# Patient Record
Sex: FEMALE | Race: BLACK OR AFRICAN AMERICAN | NOT HISPANIC OR LATINO | Employment: FULL TIME | URBAN - METROPOLITAN AREA
[De-identification: names, ages, dates, MRNs, and addresses within clinical notes are randomized per-mention and may not be internally consistent; named-entity substitution may affect disease eponyms.]

---

## 2017-06-13 ENCOUNTER — LAB VISIT (OUTPATIENT)
Dept: LAB | Facility: OTHER | Age: 52
End: 2017-06-13
Attending: OBSTETRICS & GYNECOLOGY
Payer: MEDICAID

## 2017-06-13 ENCOUNTER — OFFICE VISIT (OUTPATIENT)
Dept: OBSTETRICS AND GYNECOLOGY | Facility: CLINIC | Age: 52
End: 2017-06-13
Attending: OBSTETRICS & GYNECOLOGY
Payer: MEDICAID

## 2017-06-13 VITALS
HEIGHT: 66 IN | BODY MASS INDEX: 23.24 KG/M2 | WEIGHT: 144.63 LBS | SYSTOLIC BLOOD PRESSURE: 110 MMHG | DIASTOLIC BLOOD PRESSURE: 78 MMHG

## 2017-06-13 DIAGNOSIS — N93.9 ABNORMAL UTERINE BLEEDING (AUB): Primary | ICD-10-CM

## 2017-06-13 DIAGNOSIS — E04.0 GOITER DIFFUSE: ICD-10-CM

## 2017-06-13 DIAGNOSIS — I15.9 SECONDARY HYPERTENSION: ICD-10-CM

## 2017-06-13 DIAGNOSIS — D25.9 UTERINE LEIOMYOMA, UNSPECIFIED LOCATION: ICD-10-CM

## 2017-06-13 DIAGNOSIS — Z12.39 SCREENING BREAST EXAMINATION: ICD-10-CM

## 2017-06-13 LAB — TSH SERPL DL<=0.005 MIU/L-ACNC: 0.98 UIU/ML

## 2017-06-13 PROCEDURE — 36415 COLL VENOUS BLD VENIPUNCTURE: CPT

## 2017-06-13 PROCEDURE — 99999 PR PBB SHADOW E&M-EST. PATIENT-LVL III: CPT | Mod: PBBFAC,,, | Performed by: OBSTETRICS & GYNECOLOGY

## 2017-06-13 PROCEDURE — 99204 OFFICE O/P NEW MOD 45 MIN: CPT | Mod: S$PBB,,, | Performed by: OBSTETRICS & GYNECOLOGY

## 2017-06-13 PROCEDURE — 84443 ASSAY THYROID STIM HORMONE: CPT

## 2017-06-13 PROCEDURE — 88175 CYTOPATH C/V AUTO FLUID REDO: CPT

## 2017-06-13 NOTE — PROGRESS NOTES
"CC: AUB, fibroids    HPI: Katie Simmons is a 52 yo female who presents for evaluation of uterine fibroids which she states were diagnosed by a Dr. Shields in [place].  History of abnormal bleeding, states she was treated with a "uterus Freeze," due to heavy bleeding with severe anemia, had a colonoscopy at that time with polyp noted.  After uterine treatment, continued to have heavy periods.  Stopped bleeding/ having periods last year and had 5-7 months without periods, started bleeding again 2 months ago.  Reports hot flashes.  Current bleeding is reported as heavy and with clots.  Patient states she is not interested in a hysterectomy.      ROS:  GENERAL: Feeling well overall. Denies fever or chills.   SKIN: Denies rash or lesions.   HEAD: Denies head injury or headache.   NODES: Denies enlarged lymph nodes.   CHEST: Denies chest pain or shortness of breath.   CARDIOVASCULAR: Denies palpitations or left sided chest pain.   ABDOMEN: No abdominal pain, constipation, diarrhea, nausea, vomiting or rectal bleeding.   URINARY: No dysuria, hematuria, or burning on urination.  REPRODUCTIVE: See HPI.   BREASTS: Denies pain, lumps, or nipple discharge.   HEMATOLOGIC: No easy bruisability or excessive bleeding.   MUSCULOSKELETAL: Denies joint pain or swelling.   NEUROLOGIC: Denies syncope or weakness.   PSYCHIATRIC: Denies depression, anxiety or mood swings.    PE:   /78   Ht 5' 6" (1.676 m)   Wt 65.6 kg (144 lb 10 oz)   LMP 06/12/2017   BMI 23.34 kg/m²     APPEARANCE: Well nourished, well developed, Black or  female in no acute distress.  NODES: no cervical, supraclavicular, or inguinal lymphadenopathy  BREASTS: Symmetrical, no skin changes or visible lesions. No palpable masses, nipple discharge or adenopathy bilaterally.  ABDOMEN: Soft. No tenderness or masses. No distention. No hernias palpated. No CVA tenderness.  VULVA: No lesions. Normal external female genitalia.  URETHRAL MEATUS: " Normal size and location, no lesions, no prolapse.  URETHRA: No masses, tenderness, or prolapse.  VAGINA: Moist. No lesions or lacerations noted. No abnormal discharge present. No odor present.   CERVIX: No lesions or discharge. No cervical motion tenderness.   UTERUS: globular, multiple fibroids noted, anteverted, approximately 8 week size  ADNEXA: No tenderness. No fullness or masses palpated in the adnexal regions.   ANUS PERINEUM: Normal.      Diagnosis:  1. Abnormal uterine bleeding (AUB)    2. Uterine leiomyoma, unspecified location    3. Goiter diffuse    4. Secondary hypertension        Plan:     Orders Placed This Encounter    US Pelvis Comp with Transvag NON-OB (xpd    TSH    Liquid-based pap smear, screening         Follow-up with me in based on ultrasound    Nithya Saleh DO

## 2017-06-14 ENCOUNTER — HOSPITAL ENCOUNTER (OUTPATIENT)
Dept: RADIOLOGY | Facility: OTHER | Age: 52
Discharge: HOME | End: 2017-06-14
Attending: OBSTETRICS & GYNECOLOGY
Payer: MEDICAID

## 2017-06-14 ENCOUNTER — TELEPHONE (OUTPATIENT)
Dept: OBSTETRICS AND GYNECOLOGY | Facility: CLINIC | Age: 52
End: 2017-06-14

## 2017-06-14 VITALS — HEIGHT: 65 IN | BODY MASS INDEX: 23.99 KG/M2 | WEIGHT: 144 LBS

## 2017-06-14 DIAGNOSIS — Z12.39 SCREENING BREAST EXAMINATION: ICD-10-CM

## 2017-06-14 DIAGNOSIS — Z12.31 VISIT FOR SCREENING MAMMOGRAM: ICD-10-CM

## 2017-06-14 PROCEDURE — 77067 SCR MAMMO BI INCL CAD: CPT | Mod: TC

## 2017-06-14 PROCEDURE — 77063 BREAST TOMOSYNTHESIS BI: CPT | Mod: 26,,, | Performed by: RADIOLOGY

## 2017-06-14 PROCEDURE — 77067 SCR MAMMO BI INCL CAD: CPT | Mod: 26,,, | Performed by: RADIOLOGY

## 2017-06-15 ENCOUNTER — HOSPITAL ENCOUNTER (OUTPATIENT)
Dept: RADIOLOGY | Facility: OTHER | Age: 52
Discharge: HOME | End: 2017-06-15
Attending: OBSTETRICS & GYNECOLOGY
Payer: MEDICAID

## 2017-06-15 DIAGNOSIS — D25.9 UTERINE LEIOMYOMA, UNSPECIFIED LOCATION: ICD-10-CM

## 2017-06-15 DIAGNOSIS — N93.9 ABNORMAL UTERINE BLEEDING (AUB): ICD-10-CM

## 2017-06-15 PROCEDURE — 76856 US EXAM PELVIC COMPLETE: CPT | Mod: 26,,, | Performed by: RADIOLOGY

## 2017-06-15 PROCEDURE — 76856 US EXAM PELVIC COMPLETE: CPT | Mod: TC

## 2017-06-15 PROCEDURE — 76830 TRANSVAGINAL US NON-OB: CPT | Mod: 26,,, | Performed by: RADIOLOGY

## 2017-06-20 ENCOUNTER — TELEPHONE (OUTPATIENT)
Dept: OBSTETRICS AND GYNECOLOGY | Facility: CLINIC | Age: 52
End: 2017-06-20

## 2017-06-20 DIAGNOSIS — D25.9 UTERINE LEIOMYOMA, UNSPECIFIED LOCATION: Primary | ICD-10-CM

## 2017-06-20 NOTE — TELEPHONE ENCOUNTER
Called to inform pt per  of the following:multiple fibroids, will need f/u visit for discussion of options for management.   Pt stated that she understood.

## 2017-07-07 ENCOUNTER — OFFICE VISIT (OUTPATIENT)
Dept: OBSTETRICS AND GYNECOLOGY | Facility: CLINIC | Age: 52
End: 2017-07-07
Attending: OBSTETRICS & GYNECOLOGY
Payer: MEDICAID

## 2017-07-07 ENCOUNTER — LAB VISIT (OUTPATIENT)
Dept: LAB | Facility: OTHER | Age: 52
End: 2017-07-07
Attending: OBSTETRICS & GYNECOLOGY
Payer: MEDICAID

## 2017-07-07 VITALS
BODY MASS INDEX: 25.3 KG/M2 | SYSTOLIC BLOOD PRESSURE: 140 MMHG | HEIGHT: 65 IN | DIASTOLIC BLOOD PRESSURE: 60 MMHG | WEIGHT: 151.88 LBS

## 2017-07-07 DIAGNOSIS — D64.9 ANEMIA, UNSPECIFIED TYPE: Primary | ICD-10-CM

## 2017-07-07 DIAGNOSIS — N93.9 ABNORMAL UTERINE BLEEDING (AUB): ICD-10-CM

## 2017-07-07 DIAGNOSIS — D64.9 ANEMIA, UNSPECIFIED TYPE: ICD-10-CM

## 2017-07-07 DIAGNOSIS — D25.9 UTERINE LEIOMYOMA, UNSPECIFIED LOCATION: ICD-10-CM

## 2017-07-07 LAB
ANISOCYTOSIS BLD QL SMEAR: SLIGHT
B-HCG UR QL: NEGATIVE
BASOPHILS # BLD AUTO: 0.03 K/UL
BASOPHILS NFR BLD: 0.5 %
CTP QC/QA: YES
DIFFERENTIAL METHOD: ABNORMAL
EOSINOPHIL # BLD AUTO: 0.1 K/UL
EOSINOPHIL NFR BLD: 2.3 %
ERYTHROCYTE [DISTWIDTH] IN BLOOD BY AUTOMATED COUNT: 22.9 %
HCT VFR BLD AUTO: 32.1 %
HGB BLD-MCNC: 10 G/DL
LYMPHOCYTES # BLD AUTO: 2.3 K/UL
LYMPHOCYTES NFR BLD: 40.6 %
MCH RBC QN AUTO: 20.9 PG
MCHC RBC AUTO-ENTMCNC: 31.2 %
MCV RBC AUTO: 67 FL
MONOCYTES # BLD AUTO: 0.5 K/UL
MONOCYTES NFR BLD: 9 %
NEUTROPHILS # BLD AUTO: 2.7 K/UL
NEUTROPHILS NFR BLD: 47.6 %
OVALOCYTES BLD QL SMEAR: ABNORMAL
PLATELET # BLD AUTO: 302 K/UL
PLATELET BLD QL SMEAR: ABNORMAL
PMV BLD AUTO: ABNORMAL FL
POIKILOCYTOSIS BLD QL SMEAR: SLIGHT
RBC # BLD AUTO: 4.79 M/UL
SCHISTOCYTES BLD QL SMEAR: ABNORMAL
WBC # BLD AUTO: 5.66 K/UL

## 2017-07-07 PROCEDURE — 85025 COMPLETE CBC W/AUTO DIFF WBC: CPT

## 2017-07-07 PROCEDURE — 99999 PR PBB SHADOW E&M-EST. PATIENT-LVL III: CPT | Mod: PBBFAC,,, | Performed by: OBSTETRICS & GYNECOLOGY

## 2017-07-07 PROCEDURE — 99214 OFFICE O/P EST MOD 30 MIN: CPT | Mod: S$PBB,,, | Performed by: OBSTETRICS & GYNECOLOGY

## 2017-07-07 PROCEDURE — 36415 COLL VENOUS BLD VENIPUNCTURE: CPT

## 2017-07-07 NOTE — PROGRESS NOTES
"CC: AUB, fibroids, F/U    HPI: Brett Simmons is a 50 yo female who presents for evaluation of uterine fibroids which she states were diagnosed by a Dr. Shields in [place].  History of abnormal bleeding, states she was treated with a "uterus Freeze," due to heavy bleeding with severe anemia, had a colonoscopy at that time with polyp noted.  After uterine treatment, continued to have heavy periods.  Stopped bleeding/ having periods last year and had 5-7 months without periods, started bleeding again 2 months ago, regular monthly menses at this point, last 5 days, q 27 days, first couple of days are heavy, no bleeding in between periods.   History of transfusion due to severe anemia in 2010/11Reports hot flashes.  Current bleeding is reported as heavy and with clots.  Recently had an ultrasound demonstrating:      The uterus is slightly enlarged measuring 12.7 x 7.8 x 7.6 cm and the endometrial stripe is not clearly visualized due to the presence of multiple fibroids, but measures approximately 1.0 cm. Posterior subserosal fibroids measure 5.4 and 5.0 cm, respectively. Anterior subserosal and intramural fibroids measure 3.0 and 2.5 cm, respectively.    The right ovary measures 1.9 x 1.4 x 1.8 cm with normal blood flow.    The left ovary measures 2.4 x 1.4 x 1.8 cm with normal blood flow.    There is no free pelvic fluid.    ROS:  GENERAL: Feeling well overall. Denies fever or chills.   SKIN: Denies rash or lesions.   HEAD: Denies head injury or headache.   NODES: Denies enlarged lymph nodes.   CHEST: Denies chest pain or shortness of breath.   CARDIOVASCULAR: Denies palpitations or left sided chest pain.   ABDOMEN: No abdominal pain, constipation, diarrhea, nausea, vomiting or rectal bleeding.   URINARY: No dysuria, hematuria, or burning on urination.  REPRODUCTIVE: See HPI.   BREASTS: Denies pain, lumps, or nipple discharge.   HEMATOLOGIC: No easy bruisability or excessive bleeding.   MUSCULOSKELETAL: Denies " "joint pain or swelling.   NEUROLOGIC: Denies syncope or weakness.   PSYCHIATRIC: Denies depression, anxiety or mood swings.    PE:   BP (!) 140/60   Ht 5' 5" (1.651 m)   Wt 68.9 kg (151 lb 14.4 oz)   LMP 07/07/2017 (Exact Date)   BMI 25.28 kg/m²     APPEARANCE: Well nourished, well developed, Black or  female in no acute distress.  NODES: no cervical, supraclavicular, or inguinal lymphadenopathy  BREASTS: Symmetrical, no skin changes or visible lesions. No palpable masses, nipple discharge or adenopathy bilaterally.  ABDOMEN: Soft. No tenderness or masses. No distention. No hernias palpated. No CVA tenderness.  VULVA: No lesions. Normal external female genitalia.  URETHRAL MEATUS: Normal size and location, no lesions, no prolapse.  URETHRA: No masses, tenderness, or prolapse.  VAGINA: Moist. No lesions or lacerations noted. No abnormal discharge present. No odor present.   CERVIX: No lesions or discharge. No cervical motion tenderness.   UTERUS: globular, multiple fibroids noted, anteverted, approximately 8 week size  ADNEXA: No tenderness. No fullness or masses palpated in the adnexal regions.   ANUS PERINEUM: Normal.      Diagnosis:  1. Anemia, unspecified type    2. Abnormal uterine bleeding (AUB)    3. Uterine leiomyoma, unspecified location        Plan:   Discussed options for management of fibroids including hyst, UAE, myomectomy and watchful waiting, will consider, desires no intervention at this time.  Will get CBC today to see if intervention is urgent.  Orders Placed This Encounter    CBC auto differential    POCT Urine Pregnancy         Follow-up with me in based on labs    Nithya Saleh DO  "

## 2017-07-26 ENCOUNTER — TELEPHONE (OUTPATIENT)
Dept: OBSTETRICS AND GYNECOLOGY | Facility: CLINIC | Age: 52
End: 2017-07-26

## 2017-07-26 NOTE — TELEPHONE ENCOUNTER
Called to inform patient that her PCP prescribed the Rx that she would like to have refilled, I informed her that she would need to give them a  Call for a refill. Pt stated that she understood.

## 2017-07-26 NOTE — TELEPHONE ENCOUNTER
----- Message from Ld Hernandez sent at 7/25/2017 12:48 PM CDT -----  X_  1st Request  _  2nd Request  _  3rd Request    Please refill the medication(s) listed below. Please call the patient when the prescription(s) is ready for  at the phone number [phone] .    Medication #1  lisinopril-hydrochlorothiazide (PRINZIDE,ZESTORETIC) 20-12.5 mg per tablet 60 tablet 1 7/22/2013 8/21/2013 --  Sig - Route: Take 1 tablet by mouth 2 (two) times daily. - Oral  Class: Normal  Order: [id number]

## 2019-11-19 ENCOUNTER — HOSPITAL ENCOUNTER (EMERGENCY)
Facility: HOSPITAL | Age: 54
Discharge: HOME | End: 2019-11-19
Attending: EMERGENCY MEDICINE
Payer: MEDICAID

## 2019-11-19 VITALS
HEART RATE: 71 BPM | OXYGEN SATURATION: 100 % | RESPIRATION RATE: 18 BRPM | SYSTOLIC BLOOD PRESSURE: 143 MMHG | TEMPERATURE: 98 F | DIASTOLIC BLOOD PRESSURE: 82 MMHG

## 2019-11-19 DIAGNOSIS — R53.83 FATIGUE, UNSPECIFIED TYPE: ICD-10-CM

## 2019-11-19 DIAGNOSIS — M54.42 ACUTE BILATERAL LOW BACK PAIN WITH BILATERAL SCIATICA: ICD-10-CM
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
## 2019-11-19 DIAGNOSIS — Z72.821 INADEQUATE SLEEP HYGIENE: ICD-10-CM

## 2019-11-19 DIAGNOSIS — M54.41 ACUTE BILATERAL LOW BACK PAIN WITH BILATERAL SCIATICA: ICD-10-CM

## 2019-11-19 DIAGNOSIS — R55 FAINTED: ICD-10-CM

## 2019-11-19 LAB
ANION GAP SERPL CALC-SCNC: 9 MMOL/L (ref 8–16)
B-HCG UR QL: NEGATIVE
BASOPHILS # BLD AUTO: 0.04 K/UL (ref 0–0.2)
BASOPHILS NFR BLD: 1.3 % (ref 0–1.9)
BUN SERPL-MCNC: 14 MG/DL (ref 6–20)
CALCIUM SERPL-MCNC: 9.5 MG/DL (ref 8.7–10.5)
CHLORIDE SERPL-SCNC: 109 MMOL/L (ref 95–110)
CO2 SERPL-SCNC: 22 MMOL/L (ref 23–29)
CREAT SERPL-MCNC: 0.8 MG/DL (ref 0.5–1.4)
CTP QC/QA: YES
DIFFERENTIAL METHOD: ABNORMAL
EOSINOPHIL # BLD AUTO: 0.1 K/UL (ref 0–0.5)
EOSINOPHIL NFR BLD: 3.8 % (ref 0–8)
ERYTHROCYTE [DISTWIDTH] IN BLOOD BY AUTOMATED COUNT: 13 % (ref 11.5–14.5)
EST. GFR  (AFRICAN AMERICAN): >60 ML/MIN/1.73 M^2
EST. GFR  (NON AFRICAN AMERICAN): >60 ML/MIN/1.73 M^2
GLUCOSE SERPL-MCNC: 97 MG/DL (ref 70–110)
HCT VFR BLD AUTO: 40.8 % (ref 37–48.5)
HGB BLD-MCNC: 12.7 G/DL (ref 12–16)
IMM GRANULOCYTES # BLD AUTO: 0.02 K/UL (ref 0–0.04)
IMM GRANULOCYTES NFR BLD AUTO: 0.6 % (ref 0–0.5)
LYMPHOCYTES # BLD AUTO: 1.4 K/UL (ref 1–4.8)
LYMPHOCYTES NFR BLD: 43.5 % (ref 18–48)
MCH RBC QN AUTO: 27.3 PG (ref 27–31)
MCHC RBC AUTO-ENTMCNC: 31.1 G/DL (ref 32–36)
MCV RBC AUTO: 88 FL (ref 82–98)
MONOCYTES # BLD AUTO: 0.4 K/UL (ref 0.3–1)
MONOCYTES NFR BLD: 13 % (ref 4–15)
NEUTROPHILS # BLD AUTO: 1.2 K/UL (ref 1.8–7.7)
NEUTROPHILS NFR BLD: 37.8 % (ref 38–73)
NRBC BLD-RTO: 0 /100 WBC
PLATELET # BLD AUTO: 209 K/UL (ref 150–350)
PMV BLD AUTO: 11.2 FL (ref 9.2–12.9)
POCT GLUCOSE: 81 MG/DL (ref 70–110)
POTASSIUM SERPL-SCNC: 3.7 MMOL/L (ref 3.5–5.1)
RBC # BLD AUTO: 4.66 M/UL (ref 4–5.4)
SODIUM SERPL-SCNC: 140 MMOL/L (ref 136–145)
WBC # BLD AUTO: 3.15 K/UL (ref 3.9–12.7)

## 2019-11-19 PROCEDURE — 85025 COMPLETE CBC W/AUTO DIFF WBC: CPT

## 2019-11-19 PROCEDURE — 93010 EKG 12-LEAD: ICD-10-PCS | Mod: ,,, | Performed by: INTERNAL MEDICINE

## 2019-11-19 PROCEDURE — 81025 URINE PREGNANCY TEST: CPT | Performed by: EMERGENCY MEDICINE

## 2019-11-19 PROCEDURE — 99284 EMERGENCY DEPT VISIT MOD MDM: CPT | Mod: 25

## 2019-11-19 PROCEDURE — 93010 ELECTROCARDIOGRAM REPORT: CPT | Mod: ,,, | Performed by: INTERNAL MEDICINE

## 2019-11-19 PROCEDURE — 99285 PR EMERGENCY DEPT VISIT,LEVEL V: ICD-10-PCS | Mod: ,,, | Performed by: PHYSICIAN ASSISTANT

## 2019-11-19 PROCEDURE — 99285 EMERGENCY DEPT VISIT HI MDM: CPT | Mod: ,,, | Performed by: PHYSICIAN ASSISTANT

## 2019-11-19 PROCEDURE — 93005 ELECTROCARDIOGRAM TRACING: CPT

## 2019-11-19 PROCEDURE — 80048 BASIC METABOLIC PNL TOTAL CA: CPT

## 2019-11-19 PROCEDURE — 25000003 PHARM REV CODE 250: Performed by: PHYSICIAN ASSISTANT

## 2019-11-19 RX ORDER — ACETAMINOPHEN 325 MG/1
650 TABLET ORAL
Status: COMPLETED | OUTPATIENT
Start: 2019-11-19 | End: 2019-11-19

## 2019-11-19 RX ORDER — KETOROLAC TROMETHAMINE 10 MG/1
10 TABLET, FILM COATED ORAL
Status: DISCONTINUED | OUTPATIENT
Start: 2019-11-19 | End: 2019-11-19

## 2019-11-19 RX ORDER — NABUMETONE 500 MG/1
500 TABLET, FILM COATED ORAL 2 TIMES DAILY PRN
Qty: 14 TABLET | Refills: 0 | Status: SHIPPED | OUTPATIENT
Start: 2019-11-19 | End: 2019-11-26

## 2019-11-19 RX ADMIN — ACETAMINOPHEN 650 MG: 325 TABLET ORAL at 12:11
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
## 2019-11-19 NOTE — ED NOTES
Patient identifiers verified and correct for MS Simmons  C/C: Low back pain, onrma thigh pain  SEE NN  APPEARANCE: awake and alert in NAD.  SKIN: warm, dry and intact. No breakdown or bruising.  MUSCULOSKELETAL: Patient moving all extremities spontaneously, no obvious swelling or deformities noted. Ambulates independently.  RESPIRATORY: Denies shortness of breath.Respirations unlabored.   CARDIAC: Denies CP, 2+ distal pulses; no peripheral edema  ABDOMEN: S/ND/NT, Denies nausea  : voids spontaneously, denies difficulty  Neurologic: AAO x 4; follows commands equal strength in all extremities; denies numbness/tingling. Denies dizziness Denies weakness, ambulatory on scene
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
## 2022-11-07 ENCOUNTER — OCCUPATIONAL HEALTH (OUTPATIENT)
Dept: URGENT CARE | Facility: CLINIC | Age: 57
End: 2022-11-07

## 2022-11-07 DIAGNOSIS — Z02.89 ENCOUNTER FOR EXAMINATION REQUIRED BY DEPARTMENT OF TRANSPORTATION (DOT): Primary | ICD-10-CM

## 2022-11-07 PROCEDURE — 99499 PHYSICAL, RECERT DOT/CDL: ICD-10-PCS | Mod: S$GLB,,, | Performed by: NURSE PRACTITIONER

## 2022-11-07 PROCEDURE — 99499 UNLISTED E&M SERVICE: CPT | Mod: S$GLB,,, | Performed by: NURSE PRACTITIONER

## 2023-06-14 ENCOUNTER — OFFICE VISIT (OUTPATIENT)
Dept: URGENT CARE | Facility: CLINIC | Age: 58
End: 2023-06-14
Payer: COMMERCIAL

## 2023-06-14 VITALS
TEMPERATURE: 98 F | BODY MASS INDEX: 24.99 KG/M2 | WEIGHT: 150 LBS | DIASTOLIC BLOOD PRESSURE: 90 MMHG | HEIGHT: 65 IN | OXYGEN SATURATION: 97 % | SYSTOLIC BLOOD PRESSURE: 129 MMHG | HEART RATE: 80 BPM
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
## 2023-06-14 DIAGNOSIS — Z02.6 ENCOUNTER RELATED TO WORKER'S COMPENSATION CLAIM: ICD-10-CM

## 2023-06-14 DIAGNOSIS — S46.811A STRAIN OF RIGHT TRAPEZIUS MUSCLE, INITIAL ENCOUNTER: Primary | ICD-10-CM

## 2023-06-14 PROCEDURE — 99214 PR OFFICE/OUTPT VISIT, EST, LEVL IV, 30-39 MIN: ICD-10-PCS | Mod: S$GLB,,, | Performed by: NURSE PRACTITIONER

## 2023-06-14 PROCEDURE — 99214 OFFICE O/P EST MOD 30 MIN: CPT | Mod: S$GLB,,, | Performed by: NURSE PRACTITIONER

## 2023-06-14 RX ORDER — TIZANIDINE 4 MG/1
4 TABLET ORAL NIGHTLY PRN
Qty: 30 TABLET | Refills: 0 | Status: SHIPPED | OUTPATIENT
Start: 2023-06-14
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
## 2023-06-21 ENCOUNTER — OFFICE VISIT (OUTPATIENT)
Dept: URGENT CARE | Facility: CLINIC | Age: 58
End: 2023-06-21
Payer: COMMERCIAL

## 2023-06-21 VITALS
HEART RATE: 71 BPM | RESPIRATION RATE: 16 BRPM | DIASTOLIC BLOOD PRESSURE: 84 MMHG | BODY MASS INDEX: 24.99 KG/M2 | OXYGEN SATURATION: 97 % | HEIGHT: 65 IN | SYSTOLIC BLOOD PRESSURE: 127 MMHG | WEIGHT: 150 LBS

## 2023-06-21 DIAGNOSIS — S29.012D MUSCLE STRAIN OF RIGHT UPPER BACK, SUBSEQUENT ENCOUNTER: Primary | ICD-10-CM
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
## 2023-06-21 DIAGNOSIS — S29.011D MUSCLE STRAIN OF CHEST WALL, SUBSEQUENT ENCOUNTER: ICD-10-CM

## 2023-06-21 DIAGNOSIS — Z02.6 ENCOUNTER RELATED TO WORKER'S COMPENSATION CLAIM: ICD-10-CM

## 2023-06-21 PROCEDURE — 99214 OFFICE O/P EST MOD 30 MIN: CPT | Mod: S$GLB,,, | Performed by: STUDENT IN AN ORGANIZED HEALTH CARE EDUCATION/TRAINING PROGRAM

## 2023-06-21 PROCEDURE — 99214 PR OFFICE/OUTPT VISIT, EST, LEVL IV, 30-39 MIN: ICD-10-PCS | Mod: S$GLB,,, | Performed by: STUDENT IN AN ORGANIZED HEALTH CARE EDUCATION/TRAINING PROGRAM

## 2023-06-21 RX ORDER — LOSARTAN POTASSIUM 100 MG/1
100 TABLET ORAL
COMMUNITY
Start: 2023-06-14

## 2023-06-21 RX ORDER — IBUPROFEN 600 MG/1
600 TABLET ORAL EVERY 8 HOURS PRN
COMMUNITY
Start: 2023-06-13

## 2023-06-21 RX ORDER — AMLODIPINE BESYLATE 5 MG/1
1 TABLET ORAL DAILY
COMMUNITY
Start: 2022-09-27
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
## 2023-07-07 ENCOUNTER — OFFICE VISIT (OUTPATIENT)
Dept: URGENT CARE | Facility: CLINIC | Age: 58
End: 2023-07-07
Payer: COMMERCIAL

## 2023-07-07 VITALS
TEMPERATURE: 98 F | WEIGHT: 149 LBS | HEART RATE: 72 BPM | RESPIRATION RATE: 16 BRPM | DIASTOLIC BLOOD PRESSURE: 84 MMHG | BODY MASS INDEX: 24.83 KG/M2 | HEIGHT: 65 IN | SYSTOLIC BLOOD PRESSURE: 135 MMHG | OXYGEN SATURATION: 98 %

## 2023-07-07 DIAGNOSIS — S29.011D MUSCLE STRAIN OF CHEST WALL, SUBSEQUENT ENCOUNTER: ICD-10-CM

## 2023-07-07 DIAGNOSIS — Z02.6 ENCOUNTER RELATED TO WORKER'S COMPENSATION CLAIM: ICD-10-CM

## 2023-07-07 DIAGNOSIS — S29.012D MUSCLE STRAIN OF RIGHT UPPER BACK, SUBSEQUENT ENCOUNTER: Primary | ICD-10-CM

## 2023-07-07 PROCEDURE — 99213 OFFICE O/P EST LOW 20 MIN: CPT | Mod: S$GLB,,, | Performed by: PHYSICIAN ASSISTANT

## 2023-07-07 PROCEDURE — 99213 PR OFFICE/OUTPT VISIT, EST, LEVL III, 20-29 MIN: ICD-10-PCS | Mod: S$GLB,,, | Performed by: PHYSICIAN ASSISTANT

## 2023-07-07 RX ORDER — IBUPROFEN 600 MG/1
600 TABLET ORAL EVERY 6 HOURS PRN
Qty: 28 TABLET | Refills: 0 | Status: SHIPPED | OUTPATIENT
Start: 2023-07-07 | End: 2023-07-14
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
## 2023-07-14 ENCOUNTER — OFFICE VISIT (OUTPATIENT)
Dept: URGENT CARE | Facility: CLINIC | Age: 58
End: 2023-07-14
Payer: COMMERCIAL

## 2023-07-14 VITALS
HEIGHT: 65 IN | WEIGHT: 149 LBS | SYSTOLIC BLOOD PRESSURE: 154 MMHG | OXYGEN SATURATION: 98 % | BODY MASS INDEX: 24.83 KG/M2 | RESPIRATION RATE: 18 BRPM | DIASTOLIC BLOOD PRESSURE: 85 MMHG | HEART RATE: 74 BPM

## 2023-07-14 DIAGNOSIS — S46.811D STRAIN OF RIGHT TRAPEZIUS MUSCLE, SUBSEQUENT ENCOUNTER: ICD-10-CM

## 2023-07-14 DIAGNOSIS — S29.012D MUSCLE STRAIN OF RIGHT UPPER BACK, SUBSEQUENT ENCOUNTER: Primary | ICD-10-CM

## 2023-07-14 DIAGNOSIS — Z02.6 ENCOUNTER RELATED TO WORKER'S COMPENSATION CLAIM: ICD-10-CM
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
## 2023-07-14 DIAGNOSIS — S29.011D MUSCLE STRAIN OF CHEST WALL, SUBSEQUENT ENCOUNTER: ICD-10-CM

## 2023-07-14 DIAGNOSIS — S16.1XXA STRAIN OF CERVICAL PORTION OF LEFT TRAPEZIUS MUSCLE: ICD-10-CM

## 2023-07-14 PROCEDURE — 99215 PR OFFICE/OUTPT VISIT, EST, LEVL V, 40-54 MIN: ICD-10-PCS | Mod: S$GLB,,, | Performed by: STUDENT IN AN ORGANIZED HEALTH CARE EDUCATION/TRAINING PROGRAM

## 2023-07-14 PROCEDURE — 99215 OFFICE O/P EST HI 40 MIN: CPT | Mod: S$GLB,,, | Performed by: STUDENT IN AN ORGANIZED HEALTH CARE EDUCATION/TRAINING PROGRAM

## 2023-07-14 RX ORDER — DICLOFENAC SODIUM 10 MG/G
2 GEL TOPICAL 3 TIMES DAILY
Qty: 100 G | Refills: 0 | Status: SHIPPED | OUTPATIENT
Start: 2023-07-14
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
## 2023-07-21 ENCOUNTER — OFFICE VISIT (OUTPATIENT)
Dept: URGENT CARE | Facility: CLINIC | Age: 58
End: 2023-07-21
Payer: COMMERCIAL

## 2023-07-21 VITALS
HEIGHT: 65 IN | WEIGHT: 149 LBS | DIASTOLIC BLOOD PRESSURE: 82 MMHG | HEART RATE: 75 BPM | BODY MASS INDEX: 24.83 KG/M2 | TEMPERATURE: 99 F | OXYGEN SATURATION: 100 % | SYSTOLIC BLOOD PRESSURE: 131 MMHG

## 2023-07-21 DIAGNOSIS — S16.1XXA STRAIN OF CERVICAL PORTION OF LEFT TRAPEZIUS MUSCLE: ICD-10-CM

## 2023-07-21 DIAGNOSIS — S29.011D MUSCLE STRAIN OF CHEST WALL, SUBSEQUENT ENCOUNTER: ICD-10-CM

## 2023-07-21 DIAGNOSIS — Z02.6 ENCOUNTER RELATED TO WORKER'S COMPENSATION CLAIM: Primary | ICD-10-CM
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
## 2023-07-21 DIAGNOSIS — S29.012D MUSCLE STRAIN OF RIGHT UPPER BACK, SUBSEQUENT ENCOUNTER: ICD-10-CM

## 2023-07-21 DIAGNOSIS — S46.811D STRAIN OF RIGHT TRAPEZIUS MUSCLE, SUBSEQUENT ENCOUNTER: ICD-10-CM

## 2023-07-21 PROCEDURE — 99214 PR OFFICE/OUTPT VISIT, EST, LEVL IV, 30-39 MIN: ICD-10-PCS | Mod: S$GLB,,, | Performed by: NURSE PRACTITIONER

## 2023-07-21 PROCEDURE — 99214 OFFICE O/P EST MOD 30 MIN: CPT | Mod: S$GLB,,, | Performed by: NURSE PRACTITIONER
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
## 2023-08-04 ENCOUNTER — OFFICE VISIT (OUTPATIENT)
Dept: URGENT CARE | Facility: CLINIC | Age: 58
End: 2023-08-04
Payer: COMMERCIAL

## 2023-08-04 VITALS
TEMPERATURE: 98 F | RESPIRATION RATE: 16 BRPM | SYSTOLIC BLOOD PRESSURE: 136 MMHG | DIASTOLIC BLOOD PRESSURE: 82 MMHG | OXYGEN SATURATION: 99 % | HEART RATE: 69 BPM | BODY MASS INDEX: 23.32 KG/M2 | WEIGHT: 140 LBS | HEIGHT: 65 IN

## 2023-08-04 DIAGNOSIS — S16.1XXA STRAIN OF CERVICAL PORTION OF LEFT TRAPEZIUS MUSCLE: ICD-10-CM

## 2023-08-04 DIAGNOSIS — S46.811D STRAIN OF RIGHT TRAPEZIUS MUSCLE, SUBSEQUENT ENCOUNTER: ICD-10-CM

## 2023-08-04 DIAGNOSIS — Z02.6 ENCOUNTER RELATED TO WORKER'S COMPENSATION CLAIM: ICD-10-CM

## 2023-08-04 DIAGNOSIS — M25.611 DECREASED RANGE OF MOTION OF RIGHT SHOULDER: ICD-10-CM

## 2023-08-04 DIAGNOSIS — S46.001D ROTATOR CUFF INJURY, RIGHT, SUBSEQUENT ENCOUNTER: Primary | ICD-10-CM

## 2023-08-04 DIAGNOSIS — M54.6 ACUTE BILATERAL THORACIC BACK PAIN: ICD-10-CM

## 2023-08-04 DIAGNOSIS — S29.011D MUSCLE STRAIN OF CHEST WALL, SUBSEQUENT ENCOUNTER: ICD-10-CM

## 2023-08-04 PROCEDURE — 99215 PR OFFICE/OUTPT VISIT, EST, LEVL V, 40-54 MIN: ICD-10-PCS | Mod: S$GLB,,, | Performed by: STUDENT IN AN ORGANIZED HEALTH CARE EDUCATION/TRAINING PROGRAM

## 2023-08-04 PROCEDURE — 99215 OFFICE O/P EST HI 40 MIN: CPT | Mod: S$GLB,,, | Performed by: STUDENT IN AN ORGANIZED HEALTH CARE EDUCATION/TRAINING PROGRAM

## 2023-08-04 NOTE — LETTER
[facility]  [address] [address]  Phone: [phone]  Fax: [phone]  [facility]: [email or web address]    Pt Name: Brett Simmons  Injury Date: 04/09/2023   Employee ID: [id number] Date of Treatment: 08/04/2023   Company: [employer]      Appointment Time: 01:15 PM Arrived: 1:30pm   Provider: Milagros Louis MD Time Out:1:39pm     Office Treatment:   1. Rotator cuff injury, right, subsequent encounter    2. Encounter related to worker's compensation claim    3. Decreased range of motion of right shoulder    4. Strain of right trapezius muscle, subsequent encounter    5. Muscle strain of chest wall, subsequent encounter    6. Strain of cervical portion of left trapezius muscle    7. Acute bilateral thoracic back pain          Patient Instructions: MRI to be scheduled once authorized, Attention not to aggravate affected area      Restrictions: No lifting/pushing/pulling more than 10 lbs, No above the shoulder/overhead work, No driving company vehicles     Return Appointment: 8/18/2023 at 1:30pm.  [facility]
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
## 2023-08-18 ENCOUNTER — OFFICE VISIT (OUTPATIENT)
Dept: URGENT CARE | Facility: CLINIC | Age: 58
End: 2023-08-18
Payer: COMMERCIAL

## 2023-08-18 VITALS
BODY MASS INDEX: 24.83 KG/M2 | HEART RATE: 84 BPM | WEIGHT: 149 LBS | DIASTOLIC BLOOD PRESSURE: 79 MMHG | SYSTOLIC BLOOD PRESSURE: 129 MMHG | TEMPERATURE: 99 F | RESPIRATION RATE: 16 BRPM | HEIGHT: 65 IN | OXYGEN SATURATION: 100 %

## 2023-08-18 DIAGNOSIS — S29.011D MUSCLE STRAIN OF CHEST WALL, SUBSEQUENT ENCOUNTER: ICD-10-CM

## 2023-08-18 DIAGNOSIS — S16.1XXA STRAIN OF CERVICAL PORTION OF LEFT TRAPEZIUS MUSCLE: ICD-10-CM

## 2023-08-18 DIAGNOSIS — S46.811D STRAIN OF RIGHT TRAPEZIUS MUSCLE, SUBSEQUENT ENCOUNTER: ICD-10-CM

## 2023-08-18 DIAGNOSIS — M54.6 ACUTE BILATERAL THORACIC BACK PAIN: ICD-10-CM

## 2023-08-18 DIAGNOSIS — M25.611 DECREASED RANGE OF MOTION OF RIGHT SHOULDER: ICD-10-CM

## 2023-08-18 DIAGNOSIS — Z02.6 ENCOUNTER RELATED TO WORKER'S COMPENSATION CLAIM: ICD-10-CM

## 2023-08-18 DIAGNOSIS — S46.001D ROTATOR CUFF INJURY, RIGHT, SUBSEQUENT ENCOUNTER: Primary | ICD-10-CM

## 2023-08-18 PROCEDURE — 99214 OFFICE O/P EST MOD 30 MIN: CPT | Mod: S$GLB,,, | Performed by: NURSE PRACTITIONER

## 2023-08-18 PROCEDURE — 99214 PR OFFICE/OUTPT VISIT, EST, LEVL IV, 30-39 MIN: ICD-10-PCS | Mod: S$GLB,,, | Performed by: NURSE PRACTITIONER

## 2023-08-18 NOTE — LETTER
[facility]  [address] [address]  Phone: [phone]  Fax: [phone]  [facility]: [email or web address]    Pt Name: Brett Simmons  Injury Date: 04/09/2023   Employee ID: [id number] Date of Treatment: 08/18/2023   Company: [email or web address]      Appointment Time: 01:30 PM Arrived: 1:32 PM   Provider: Lisa Vargas NP Time Out:3:31 PM      Office Treatment:   1. Rotator cuff injury, right, subsequent encounter    2. Decreased range of motion of right shoulder    3. Strain of right trapezius muscle, subsequent encounter    4. Muscle strain of chest wall, subsequent encounter    5. Strain of cervical portion of left trapezius muscle    6. Acute bilateral thoracic back pain    7. Encounter related to worker's compensation claim          Patient Instructions: Attention not to aggravate affected area, Daily home exercises/warm soaks, PT to be scheduled once authorized, MRI to be scheduled once authorized      Restrictions: No lifting/pushing/pulling more than 10 lbs, No driving company vehicles, No above the shoulder/overhead work     Return Appointment: 9/1/2023 at 1:30 PM ELIZABETH

## 2023-08-18 NOTE — PROGRESS NOTES
Subjective:      Patient ID: Brett Simmons is a 57 y.o. female.    Chief Complaint: Shoulder Injury and Chest Pain    Patient's place of employment - [employer]  Patient's job title -   Date of Injury - 4/9/23  Body part injured - Chest / Shoulder  Current work status per last visit - No activity  Improved, same, or worse - same  Pain Scale right now (1-10) -  8    Ms Simmons still has not had MRI or PT approved. Takes Ibuprofen occasionally (which her PCP prescribed). Still does not have w/c pharmacy card. Not working. Continues to have neck and shoulder pain. Condition unchanged since previous visit. MWT    Shoulder Injury   Associated symptoms include chest pain. Pertinent negatives include no numbness.   Chest Pain   Associated symptoms include back pain. Pertinent negatives include no numbness.       Neck: Positive for neck pain.   Cardiovascular:  Positive for chest pain.   Musculoskeletal:  Positive for pain, joint pain, abnormal ROM of joint and back pain.   Skin:  Negative for erythema and bruising.   Neurological:  Negative for numbness and tingling.     Objective:     Physical Exam  Constitutional:       General: She is not in acute distress.     Appearance: Normal appearance.   HENT:      Right Ear: External ear normal.      Left Ear: External ear normal.   Eyes:      Conjunctiva/sclera: Conjunctivae normal.   Cardiovascular:      Pulses: Normal pulses.   Pulmonary:      Effort: Pulmonary effort is normal.   Chest:       Musculoskeletal:         General: Tenderness present. No swelling.      Right shoulder: Tenderness present. Decreased range of motion. Normal strength. Normal pulse.      Left shoulder: Normal strength. Normal pulse.      Cervical back: Swelling and tenderness present. Decreased range of motion.        Back:       Comments: Pain to posterior neck with radiation to both trapezius.  Pain to right axillary region.  Pain to right shoulder with range of motion testing.  Complain of  cramping in hands.  Pain is worse on right side than left.   Skin:     Capillary Refill: Capillary refill takes less than 2 seconds.      Findings: No bruising or erythema.   Neurological:      General: No focal deficit present.      Mental Status: She is alert and oriented to person, place, and time.   Psychiatric:         Mood and Affect: Mood normal.         Behavior: Behavior normal.         Thought Content: Thought content normal.         Judgment: Judgment normal.        Assessment:      1. Rotator cuff injury, right, subsequent encounter    2. Decreased range of motion of right shoulder    3. Strain of right trapezius muscle, subsequent encounter    4. Muscle strain of chest wall, subsequent encounter    5. Strain of cervical portion of left trapezius muscle    6. Acute bilateral thoracic back pain    7. Encounter related to worker's compensation claim      Plan:     I have placed another order today for physical therapy.  Spoke with Donte in the [facility] regarding MRI order as well as physical therapy status.  Ms. Simmons will continue to try to contact her worker's comp .     Patient Instructions: Attention not to aggravate affected area, Daily home exercises/warm soaks, PT to be scheduled once authorized, MRI to be scheduled once authorized   Restrictions: No lifting/pushing/pulling more than 10 lbs, No driving company vehicles, No above the shoulder/overhead work  Follow up in about 2 weeks (around 9/1/2023).    I spent a total of 40 minutes on the day of the visit.This includes face to face time and non-face to face time preparing to see the patient (eg, review of tests), obtaining and/or reviewing separately obtained history, documenting clinical information in the electronic or other health record, independently interpreting results and communicating results to the patient/family/caregiver, or care coordinator.

## 2023-08-23 ENCOUNTER — CLINICAL SUPPORT (OUTPATIENT)
Dept: REHABILITATION | Facility: HOSPITAL | Age: 58
End: 2023-08-23
Payer: COMMERCIAL

## 2023-08-23 DIAGNOSIS — G89.29 CHRONIC RIGHT SHOULDER PAIN: ICD-10-CM

## 2023-08-23 DIAGNOSIS — M25.511 CHRONIC RIGHT SHOULDER PAIN: ICD-10-CM

## 2023-08-23 PROCEDURE — 97161 PT EVAL LOW COMPLEX 20 MIN: CPT | Mod: PO

## 2023-08-23 PROCEDURE — 97112 NEUROMUSCULAR REEDUCATION: CPT | Mod: PO

## 2023-08-23 PROCEDURE — 97140 MANUAL THERAPY 1/> REGIONS: CPT | Mod: PO

## 2023-08-23 NOTE — PROGRESS NOTES
"[facility]   Physical Therapy Initial Evaluation      Name: Brett Simmons  Clinic Number: [id number]    Therapy Diagnosis: No diagnosis found.     Physician: Lisa Vargas NP    Physician Orders: PT Eval and Treat   Medical Diagnosis from Referral:   M25.611 (ICD-10-CM) - Decreased range of motion of right shoulder   S46.001D (ICD-10-CM) - Rotator cuff injury, right, subsequent encounter   S46.811D (ICD-10-CM) - Strain of right trapezius muscle, subsequent encounter   S16.1XXA (ICD-10-CM) - Strain of cervical portion of left trapezius muscle   M54.6 (ICD-10-CM) - Acute bilateral thoracic back pain     Evaluation Date: 2023  Authorization Period Expiration: 2023  Plan of Care Expiration: 10/23/2023  Progress Note Due: 2023  Visit # / Visits authorized:    FOTO: 1/ 3    Precautions: {IP WOUND PRECAUTIONS OHS:09079}     Time In: 2:45  Time Out: 3:30  Total Billable Time: 45 minutes    Subjective     Date of onset: ***    History of current condition - [place] reports: ***    Falls: ***    Imaging: {Mri/ctscan/bone scan:26763}: ***    Prior Therapy: ***  Social History: *** {LIVES WITH:37304}  Occupation: ***  Prior Level of Function: ***  Current Level of Function: ***    Pain:  Current {0-10:90045::"0"}/10, worst {0-10:88858::"0"}/10, best {0-10:79609::"0"}/10   Location: {RIGHT LEFT BILATERAL:56653} {LOCATION ON BODY:71721} {Pain Loc:21902}  Description: {Pain Description:81235}  Aggravating Factors: {Causes; Pain:27922}  Easing Factors: {Pain (activities that relieve):63599}    Patients goals: ***     Medical History:   Past Medical History:   Diagnosis Date    Blood transfusion     Hypertension        Surgical History:   Brett Simmons  has a past surgical history that includes  section.    Medications:   Brett Wayne has a current medication list which includes the following prescription(s): amlodipine, diclofenac sodium, ferrous sulfate, " "ibuprofen, losartan, and tizanidine.    Allergies:   Review of patient's allergies indicates:   Allergen Reactions    Tetracyclines Hives        Objective      ***    Intake Outcome Measure for FOTO *** Survey    Therapist reviewed FOTO scores for Brett Simmons on 8/23/2023.   FOTO report - see Media section or FOTO account episode details.    Intake Score: ***%         Treatment     Total Treatment time (time-based codes) separate from Evaluation: *** minutes     Brett Wayne received the treatments listed below:      therapeutic exercises to develop {AMB PT PROGRESS OBJECTIVE:42006} for *** minutes including:  ***    manual therapy techniques: {AMB PT PROGRESS MANUAL THERAPY:75792} were applied to the: *** for *** minutes, including:  ***    neuromuscular re-education activities to improve: {AMB PT PROGRESS NEURO RE-ED:17343} for *** minutes. The following activities were included:  ***    therapeutic activities to improve functional performance for ***  minutes, including:  ***    gait training to improve functional mobility and safety for ***  minutes, including:  ***    direct contact modalities after being cleared for contraindications: {AMB PT PROGRESS DIRECT CONTACT MODES:30159}    supervised modalities after being cleared for contradictions: {AMB PT SUPERVISED MODES:04121}    hot pack for *** minutes to ***.    cold pack for *** minutes to ***.    Patient Education and Home Exercises     Education provided:   - ***    Written Home Exercises Provided: {Blank single:16754::"yes","Patient instructed to cont prior HEP"}. Exercises were reviewed and Brett Wayne was able to demonstrate them prior to the end of the session.  Brett Wayne demonstrated {Desc; good/fair/poor:04389} understanding of the education provided. See EMR under Patient Instructions for exercises provided during therapy sessions.    Assessment     Brett Wayne is a 57 y.o. female referred to outpatient Physical Therapy with a medical diagnosis of ***. " "Patient presents with ***    Patient prognosis is {REHAB PROGNOSIS OHS:71264}.   Patient will benefit from skilled outpatient Physical Therapy to address the deficits stated above and in the chart below, provide patient /family education, and to maximize patientt's level of independence.     Plan of care discussed with patient: {YES:32645}  Patient's spiritual, cultural and educational needs considered and patient is agreeable to the plan of care and goals as stated below:     Anticipated Barriers for therapy: ***    Medical Necessity is demonstrated by the following  History  Co-morbidities and personal factors that may impact the plan of care [] LOW: no personal factors / co-morbidities  [] MODERATE: 1-2 personal factors / co-morbidities  [] HIGH: 3+ personal factors / co-morbidities    Moderate / High Support Documentation:   Co-morbidities affecting plan of care: ***    Personal Factors:   {Personal Factors:77894}     Examination  Body Structures and Functions, activity limitations and participation restrictions that may impact the plan of care [] LOW: addressing 1-2 elements  [] MODERATE: 3+ elements  [] HIGH: 4+ elements (please support below)    Moderate / High Support Documentation: ***     Clinical Presentation [] LOW: stable  [] MODERATE: Evolving  [] HIGH: Unstable     Decision Making/ Complexity Score: {Desc; low/moderate/high:943455}       Goals:  Short Term Goals: *** weeks   ***    Long Term Goals: *** weeks   ***  Plan     Plan of care Certification: 8/23/2023 to ***.    Outpatient Physical Therapy {NUMBERS 1-5:45954} times weekly for {0-10:36652::"0"} weeks to include the following interventions: {[place] PLAN:03141}.     Iftikhar Cesar, PT        Physician's Signature: _________________________________________ Date: ________________     "
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
## 2023-08-26 ENCOUNTER — HOSPITAL ENCOUNTER (OUTPATIENT)
Dept: RADIOLOGY | Facility: OTHER | Age: 58
Discharge: HOME | End: 2023-08-26
Attending: STUDENT IN AN ORGANIZED HEALTH CARE EDUCATION/TRAINING PROGRAM
Payer: COMMERCIAL

## 2023-08-26 DIAGNOSIS — Z02.6 ENCOUNTER RELATED TO WORKER'S COMPENSATION CLAIM: ICD-10-CM

## 2023-08-26 DIAGNOSIS — M25.611 DECREASED RANGE OF MOTION OF RIGHT SHOULDER: ICD-10-CM

## 2023-08-26 DIAGNOSIS — S46.001D ROTATOR CUFF INJURY, RIGHT, SUBSEQUENT ENCOUNTER: ICD-10-CM

## 2023-08-26 DIAGNOSIS — S46.811D STRAIN OF RIGHT TRAPEZIUS MUSCLE, SUBSEQUENT ENCOUNTER: ICD-10-CM

## 2023-08-26 PROCEDURE — 73221 MRI JOINT UPR EXTREM W/O DYE: CPT | Mod: TC,RT

## 2023-08-26 PROCEDURE — 73221 MRI JOINT UPR EXTREM W/O DYE: CPT | Mod: 26,RT,, | Performed by: RADIOLOGY

## 2023-08-26 PROCEDURE — 73221 MRI SHOULDER WITHOUT CONTRAST RIGHT: ICD-10-PCS | Mod: 26,RT,, | Performed by: RADIOLOGY

## 2023-08-28 PROBLEM — G89.29 CHRONIC RIGHT SHOULDER PAIN: Status: ACTIVE | Noted: 2023-08-28

## 2023-08-28 PROBLEM — M25.511 CHRONIC RIGHT SHOULDER PAIN: Status: ACTIVE | Noted: 2023-08-28
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
## 2023-09-01 ENCOUNTER — OFFICE VISIT (OUTPATIENT)
Dept: URGENT CARE | Facility: CLINIC | Age: 58
End: 2023-09-01
Payer: COMMERCIAL

## 2023-09-01 VITALS
HEART RATE: 71 BPM | BODY MASS INDEX: 23.32 KG/M2 | HEIGHT: 65 IN | TEMPERATURE: 99 F | OXYGEN SATURATION: 98 % | RESPIRATION RATE: 16 BRPM | DIASTOLIC BLOOD PRESSURE: 85 MMHG | SYSTOLIC BLOOD PRESSURE: 148 MMHG | WEIGHT: 140 LBS

## 2023-09-01 DIAGNOSIS — Z02.6 ENCOUNTER RELATED TO WORKER'S COMPENSATION CLAIM: ICD-10-CM

## 2023-09-01 DIAGNOSIS — S46.811D STRAIN OF RIGHT TRAPEZIUS MUSCLE, SUBSEQUENT ENCOUNTER: ICD-10-CM

## 2023-09-01 DIAGNOSIS — S29.011D MUSCLE STRAIN OF CHEST WALL, SUBSEQUENT ENCOUNTER: ICD-10-CM

## 2023-09-01 DIAGNOSIS — M75.101 TEAR OF RIGHT SUPRASPINATUS TENDON: Primary | ICD-10-CM

## 2023-09-01 PROCEDURE — 99214 PR OFFICE/OUTPT VISIT, EST, LEVL IV, 30-39 MIN: ICD-10-PCS | Mod: S$GLB,,, | Performed by: STUDENT IN AN ORGANIZED HEALTH CARE EDUCATION/TRAINING PROGRAM

## 2023-09-01 PROCEDURE — 99214 OFFICE O/P EST MOD 30 MIN: CPT | Mod: S$GLB,,, | Performed by: STUDENT IN AN ORGANIZED HEALTH CARE EDUCATION/TRAINING PROGRAM

## 2023-09-01 RX ORDER — AMLODIPINE BESYLATE 10 MG/1
10 TABLET ORAL DAILY
COMMUNITY
End: 2023-10-18

## 2023-09-01 RX ORDER — NAPROXEN 375 MG/1
375 TABLET ORAL 2 TIMES DAILY
Qty: 20 TABLET | Refills: 0 | Status: SHIPPED | OUTPATIENT
Start: 2023-09-01 | End: 2023-10-18

## 2023-09-01 RX ORDER — CLOTRIMAZOLE AND BETAMETHASONE DIPROPIONATE 10; .64 MG/G; MG/G
CREAM TOPICAL 2 TIMES DAILY
COMMUNITY
Start: 2023-06-21

## 2023-09-01 RX ORDER — CETIRIZINE HYDROCHLORIDE 5 MG/1
TABLET ORAL
COMMUNITY
Start: 2022-09-27

## 2023-09-01 RX ORDER — METHOCARBAMOL 500 MG/1
TABLET, FILM COATED ORAL
COMMUNITY
Start: 2023-04-12

## 2023-09-01 RX ORDER — FLUTICASONE PROPIONATE 50 MCG
SPRAY, SUSPENSION (ML) NASAL
COMMUNITY
Start: 2022-09-27

## 2023-09-01 RX ORDER — MUPIROCIN 20 MG/G
OINTMENT TOPICAL
COMMUNITY
Start: 2023-05-24

## 2023-09-01 NOTE — PROGRESS NOTES
Subjective:      Patient ID: Brett Simmons is a 57 y.o. female.    Chief Complaint: Chest Pain    Patient's place of employment - [employer]  Patient's job title -   Date of Injury - 4/9/23  Body part injured - Chest / Shoulder  Current work status per last visit - No activity  Improved, same, or worse - Same  Pain Scale right now (1-10) -  8    See MA note above. Begin MD note:  Since her last visit, Ms. Simmons was approved for her MRI and PT. She had the MRI and would like to go over the results. She has completed 1 session of PT and was approved for 12 PT visits. She reports persistent pain in the right shoulder, bilateral chest. New pain in the left shoulder that radiates down her left arm to her left hand and caused it to cramp. Next PT session is 3 weeks from previous session on 9/12 and she will begin going twice a week.      Shoulder Injury   Associated symptoms include chest pain. Pertinent negatives include no numbness.   Chest Pain   Associated symptoms include back pain. Pertinent negatives include no numbness.       Neck: Positive for neck pain.   Cardiovascular:  Positive for chest pain.   Musculoskeletal:  Positive for pain, joint pain, abnormal ROM of joint and back pain.   Skin:  Negative for erythema and bruising.   Neurological:  Negative for numbness and tingling.     Objective:     Physical Exam  Vitals and nursing note reviewed.   Constitutional:       General: She is not in acute distress.     Appearance: She is not ill-appearing.   HENT:      Head: Normocephalic.   Eyes:      Conjunctiva/sclera: Conjunctivae normal.   Neck:      Comments: Rotation to the left caused pain on the left.   Pulmonary:      Effort: No respiratory distress.   Musculoskeletal:      Cervical back: Pain with movement (rotation), spinous process tenderness and muscular tenderness (right trapezius greater than left) present. Normal range of motion.      Comments: Flexion to 120 degrees, abduction to  approx 120 degrees, increased pain with external rotation, none with internal. Provacative maneuvers deferred   Skin:     General: Skin is warm and dry.      Findings: No erythema.   Neurological:      Mental Status: She is alert and oriented to person, place, and time.   Psychiatric:         Attention and Perception: Attention normal.         Mood and Affect: Mood normal.         Behavior: Behavior normal.        Assessment:      1. Tear of right supraspinatus tendon    2. Encounter related to worker's compensation claim    3. Strain of right trapezius muscle, subsequent encounter    4. Muscle strain of chest wall, subsequent encounter      Plan:     Rx for naproxen today at moderate dose in an attempt to provide anti-inflammatory effect without causing the drowsiness she reports with ibuprofen. Will use claim info at pharmacy to see if it is covered. Continue PT and RTC in approx 4 weeks for further eval, sooner if needed.     Medications Ordered This Encounter   Medications    naproxen (EC-NAPROSYN) 375 MG TbEC EC tablet     Sig: Take 1 tablet (375 mg total) by mouth 2 (two) times daily.     Dispense:  20 tablet     Refill:  0     Patient Instructions: Attention not to aggravate affected area, Continue Physical Therapy, Daily home exercises/warm soaks   Restrictions: No lifting/pushing/pulling more than 10 lbs, No driving company vehicles, No above the shoulder/overhead work  Follow up in about 26 days (around 9/27/2023).    I spent a total of 30 minutes on the day of the visit. This includes face to face time and non-face to face time preparing to see the patient (eg, review of tests, prior records/notes), obtaining and/or reviewing separately obtained history, documenting clinical information in the electronic or other health record, independently interpreting results and communicating results to the patient.
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
## 2023-09-12 ENCOUNTER — CLINICAL SUPPORT (OUTPATIENT)
Dept: REHABILITATION | Facility: HOSPITAL | Age: 58
End: 2023-09-12
Payer: COMMERCIAL

## 2023-09-12 DIAGNOSIS — M25.511 CHRONIC RIGHT SHOULDER PAIN: Primary | ICD-10-CM

## 2023-09-12 DIAGNOSIS — G89.29 CHRONIC RIGHT SHOULDER PAIN: Primary | ICD-10-CM

## 2023-09-12 PROCEDURE — 97112 NEUROMUSCULAR REEDUCATION: CPT | Mod: PO,CQ

## 2023-09-12 PROCEDURE — 97110 THERAPEUTIC EXERCISES: CPT | Mod: PO,CQ

## 2023-09-12 PROCEDURE — 97140 MANUAL THERAPY 1/> REGIONS: CPT | Mod: PO,CQ

## 2023-09-12 NOTE — PROGRESS NOTES
[facility]   Workers' Compensation Physical Therapy Treatment Note      Name: Brett Jernigan [facility] Number: [id number]    Therapy Diagnosis:   Encounter Diagnosis   Name Primary?    Chronic right shoulder pain Yes     Physician: Lisa Vargas NP    Visit Date: 9/12/2023    Physician Orders: PT Eval and Treat   Medical Diagnosis from Referral:   M25.611 (ICD-10-CM) - Decreased range of motion of right shoulder   S46.001D (ICD-10-CM) - Rotator cuff injury, right, subsequent encounter   S46.811D (ICD-10-CM) - Strain of right trapezius muscle, subsequent encounter   S16.1XXA (ICD-10-CM) - Strain of cervical portion of left trapezius muscle   M54.6 (ICD-10-CM) - Acute bilateral thoracic back pain      Evaluation Date: 8/23/2023  Authorization Period Expiration: 12/31/2023  Plan of Care Expiration: 10/23/2023  Progress Note Due: 9/23/2023  Visit # / Visits authorized: 2/ 20   (# of No Show Appts 0/Number of Cancelled Appts 0)    FOTO: 1/ 3      Precautions: Standard     Time In: 2:00 pm  Time Out: 2:45 pm  Total Billable Time: 45 minutes    PTA: 1/5        Occupation (including job description if provided):   Job Demands: prolonged sitting and arm movements  Current Work Restrictions: out of work  Previous work status: Full time  Current work status: not working  Date last worked (if applicable): 4/09/2023    Subjective     Pt reports: she has trouble holding groceries or her purse in her right arm   She was compliant with home exercise program.  Response to previous treatment: soreness following treatment  Function: difficulty with cooking, bathing using RUE    Pain: 10/10  Location: right shoulder    Objective      Objective Measures updated at progress report unless specified.     Treatment     Brett Wayne received the treatments listed below:      therapeutic exercises to develop strength, endurance, and ROM for 08 minutes including:  AAROM shoulder flexion with dowel  "10x5"  AAROM shoulder ER with dowel 10x5"    manual therapy techniques: Joint mobilizations and Soft tissue Mobilization were applied to the: neck, right shoulder for 14 minutes, including:  GH joint mobilizations  STM to left upper trap, levator scapulae  PROM right shoulder    neuromuscular re-education activities to improve: Coordination, Kinesthetic, and Posture for 23 minutes. The following activities were included:  Chin retractions x20  Scapular retractions x20  Shoulder ER and IR isometrics at wall 10x5"    therapeutic activities to improve functional performance for 00  minutes, including:        Patient Education and Home Exercises       Education provided:   - HEP    Home Exercises Provided: Patient instructed to cont prior HEP. Exercises were reviewed and Brett Wayne was able to demonstrate them prior to the end of the session.  Brett Wayne demonstrated good  understanding of the education provided. See EMR under Patient Instructions for exercises provided during therapy sessions    Assessment     Brett Wayne presents to treatment with reports of 10/10 pain in her right shoulder, but was able to maintain a conversation, smiling and laughing at times and completing all exercises with no facial grimacing or reports of increased pain noted. Muscle guarding noted during manual therapy interventions with max cueing for relaxing her shoulder during PROM. Instructed her on dowel exercises to improve range of motion as well as shoulder isometrics to improve muscular activation. She reported a decrease in pain following treatment today. Continue to progress as tolerated.     The patient's current job specific task deficits include the following: prolonged sitting, driving, walking, reaching and lifting.    Brett Wayne Is is making Good progress towards meeting her goals.     Patient prognosis is: Good.   Rehab potential is:  Good    Skilled Physical Therapy intervention is required at this time for the injured worker to address " the musculoskeletal limitations and work-related functional deficits for their job as a .     Pt's spiritual, cultural and educational needs considered and pt agreeable to plan of care and goals.     Anticipated Barriers for therapy: self-limiting behaviors due to pain    Goals:      Short Term Goals: 3 weeks   Pt will be instructed in an exercise program to address functional deficits related to her neck, upper back and R shoulder Sx.  Decreased pain with ADL's to </= 6/10 at worst  Improve R shoulder ROM to >/= 130 degrees of flexion, 110 degrees of abduction and 30 degrees of internal and external rotation at 90 degrees of abduction.     Long Term Goals: 6 weeks   Pt will return to work full duty, full time.  Pt will be independent in a HEP to assist in managing their R upper quarter Sx.   Decreased pain with ADL's to </= 3/10 at worst  Improve R shoulder ROM to >/= 150 degrees of flexion, 130 degrees of abduction and 45 degrees of internal and external rotation at 90 degrees of abduction.  Pt will report improved functional ability through an improved score on the FOTO Survey.    Plan     Plan of care Certification: 8/23/2023 to 10/23/2023.     Outpatient Physical Therapy 2 times weekly for 6 weeks to include the following interventions: Cervical/Lumbar Traction, Electrical Stimulation as indicated, Manual Therapy, Moist Heat/ Ice, Neuromuscular Re-ed, Patient Education, Self Care, Therapeutic Activities, Therapeutic Exercise, and Dry needling.      Upon discharge from further skilled PT intervention it will determined if the need for a work conditioning program or Functional Capacity Evaluation is required to allow the injured worker to return to work with full potential achieved, continued improvement with body mechanics with advanced functional activities, and further prevention of future work-related injuries.        Maye Hernadez, PTA   9/12/2023

## 2023-09-14 ENCOUNTER — CLINICAL SUPPORT (OUTPATIENT)
Dept: REHABILITATION | Facility: HOSPITAL | Age: 58
End: 2023-09-14
Payer: COMMERCIAL

## 2023-09-14 DIAGNOSIS — G89.29 CHRONIC RIGHT SHOULDER PAIN: Primary | ICD-10-CM

## 2023-09-14 DIAGNOSIS — M25.511 CHRONIC RIGHT SHOULDER PAIN: Primary | ICD-10-CM

## 2023-09-14 PROCEDURE — 97140 MANUAL THERAPY 1/> REGIONS: CPT | Mod: PO

## 2023-09-14 NOTE — PROGRESS NOTES
[facility]   Workers' Compensation Physical Therapy Treatment Note      Name: Brett Jernigan [place]  Clinic Number: [id number]    Therapy Diagnosis:   Encounter Diagnosis   Name Primary?    Chronic right shoulder pain Yes     Physician: Lisa Vargas NP    Visit Date: 9/14/2023    Physician Orders: PT Eval and Treat   Medical Diagnosis from Referral:   M25.611 (ICD-10-CM) - Decreased range of motion of right shoulder   S46.001D (ICD-10-CM) - Rotator cuff injury, right, subsequent encounter   S46.811D (ICD-10-CM) - Strain of right trapezius muscle, subsequent encounter   S16.1XXA (ICD-10-CM) - Strain of cervical portion of left trapezius muscle   M54.6 (ICD-10-CM) - Acute bilateral thoracic back pain      Evaluation Date: 8/23/2023  Authorization Period Expiration: 12/31/2023  Plan of Care Expiration: 10/23/2023  Progress Note Due: 9/23/2023  Visit # / Visits authorized: 3/ 20   (# of No Show Appts 0/Number of Cancelled Appts 0)    FOTO: 1/ 3      Precautions: Standard     Time In: 2:45 pm  Time Out: 3:15 pm  Total Billable Time: 45 minutes    PTA: 1/5        Occupation (including job description if provided):   Job Demands: prolonged sitting and arm movements  Current Work Restrictions: out of work  Previous work status: Full time  Current work status: not working  Date last worked (if applicable): 4/09/2023    Subjective     Pt reports: that her shoulder still stiff and sore  She was compliant with home exercise program.  Response to previous treatment: soreness following treatment  Function: difficulty with cooking, bathing using RUE    Pain: 10/10  Location: right shoulder    Objective      Objective Measures updated at progress report unless specified.     Treatment     Brett Wayne received the treatments listed below:    Bolded activities performed today:  therapeutic exercises to develop strength, endurance, and ROM for 00 minutes including:  AAROM shoulder flexion with  "dowel 10x5"  AAROM shoulder ER with dowel 10x5"    manual therapy techniques: Joint mobilizations and Soft tissue Mobilization were applied to the: neck, right shoulder for 40 minutes, including:  R GH joint passive mobilization  STM to left upper trap, levator scapulae  Soft tissue mobilization to the R pectoral, latissimus and subscapularis muscles  PROM right shoulder    neuromuscular re-education activities to improve: Coordination, Kinesthetic, and Posture for 00 minutes. The following activities were included:  Chin retractions x20  Scapular retractions x20  Shoulder ER and IR isometrics at wall 10x5"    therapeutic activities to improve functional performance for 00  minutes, including:        Patient Education and Home Exercises       Education provided:   - HEP    Home Exercises Provided: Patient instructed to cont prior HEP. Exercises were reviewed and Brett Wayne was able to demonstrate them prior to the end of the session.  Brett Wayne demonstrated good  understanding of the education provided. See EMR under Patient Instructions for exercises provided during therapy sessions    Assessment     Brett Wayne presents to treatment with continued R shoulder stiffness and soreness.  Today's Tx was geared towards increasing R shoulder ROM through passive joint and soft tissue mobilization as well as PROM.  Pt tolerated today's Tx activities with some discomfort at end range of external rotation and abduction.  Continue to progress as tolerated.     The patient's current job specific task deficits include the following: prolonged sitting, driving, walking, reaching and lifting.    Brett Wayne Is is making Good progress towards meeting her goals.     Patient prognosis is: Good.   Rehab potential is:  Good    Skilled Physical Therapy intervention is required at this time for the injured worker to address the musculoskeletal limitations and work-related functional deficits for their job as a .     Pt's spiritual, cultural " and educational needs considered and pt agreeable to plan of care and goals.     Anticipated Barriers for therapy: self-limiting behaviors due to pain    Goals:      Short Term Goals: 3 weeks   Pt will be instructed in an exercise program to address functional deficits related to her neck, upper back and R shoulder Sx.  Decreased pain with ADL's to </= 6/10 at worst  Improve R shoulder ROM to >/= 130 degrees of flexion, 110 degrees of abduction and 30 degrees of internal and external rotation at 90 degrees of abduction.     Long Term Goals: 6 weeks   Pt will return to work full duty, full time.  Pt will be independent in a HEP to assist in managing their R upper quarter Sx.   Decreased pain with ADL's to </= 3/10 at worst  Improve R shoulder ROM to >/= 150 degrees of flexion, 130 degrees of abduction and 45 degrees of internal and external rotation at 90 degrees of abduction.  Pt will report improved functional ability through an improved score on the FOTO Survey.    Plan     Plan of care Certification: 8/23/2023 to 10/23/2023.     Outpatient Physical Therapy 2 times weekly for 6 weeks to include the following interventions: Cervical/Lumbar Traction, Electrical Stimulation as indicated, Manual Therapy, Moist Heat/ Ice, Neuromuscular Re-ed, Patient Education, Self Care, Therapeutic Activities, Therapeutic Exercise, and Dry needling.      Upon discharge from further skilled PT intervention it will determined if the need for a work conditioning program or Functional Capacity Evaluation is required to allow the injured worker to return to work with full potential achieved, continued improvement with body mechanics with advanced functional activities, and further prevention of future work-related injuries.        Iftikhar Cesar, PT   9/14/2023

## 2023-09-20 ENCOUNTER — CLINICAL SUPPORT (OUTPATIENT)
Dept: REHABILITATION | Facility: HOSPITAL | Age: 58
End: 2023-09-20
Payer: COMMERCIAL

## 2023-09-20 DIAGNOSIS — G89.29 CHRONIC RIGHT SHOULDER PAIN: Primary | ICD-10-CM

## 2023-09-20 DIAGNOSIS — M25.511 CHRONIC RIGHT SHOULDER PAIN: Primary | ICD-10-CM

## 2023-09-20 PROCEDURE — 97140 MANUAL THERAPY 1/> REGIONS: CPT | Mod: PO

## 2023-09-20 PROCEDURE — 97110 THERAPEUTIC EXERCISES: CPT | Mod: PO

## 2023-09-20 NOTE — PROGRESS NOTES
[facility]   Workers' Compensation Physical Therapy Treatment Note      Name: Brett Jernigan [facility] Number: [id number]    Therapy Diagnosis:   Encounter Diagnosis   Name Primary?    Chronic right shoulder pain Yes     Physician: Lisa Vargas NP    Visit Date: 9/20/2023    Physician Orders: PT Eval and Treat   Medical Diagnosis from Referral:   M25.611 (ICD-10-CM) - Decreased range of motion of right shoulder   S46.001D (ICD-10-CM) - Rotator cuff injury, right, subsequent encounter   S46.811D (ICD-10-CM) - Strain of right trapezius muscle, subsequent encounter   S16.1XXA (ICD-10-CM) - Strain of cervical portion of left trapezius muscle   M54.6 (ICD-10-CM) - Acute bilateral thoracic back pain      Evaluation Date: 8/23/2023  Authorization Period Expiration: 12/31/2023  Plan of Care Expiration: 10/23/2023  Progress Note Due: 9/23/2023  Visit # / Visits authorized: 4/ 20   (# of No Show Appts 0/Number of Cancelled Appts 0)    FOTO: 1/ 3      Precautions: Standard     Time In: 2:45 pm  Time Out: 3:15 pm  Total Billable Time: 45 minutes    PTA: 1/5        Occupation (including job description if provided):   Job Demands: prolonged sitting and arm movements  Current Work Restrictions: out of work  Previous work status: Full time  Current work status: not working  Date last worked (if applicable): 4/09/2023    Subjective     Pt reports: that she is trying to get out of the house so that she is not sitting around just getting stiff and sore.  She also reports that both shoulders are sore, but the R is more sore than the L.  She was compliant with home exercise program.  Response to previous treatment: soreness following treatment  Function: difficulty with cooking, bathing using RUE    Pain: 10/10  Location: right shoulder    Objective      Objective Measures updated at progress report unless specified.     Treatment     Brett Wayne received the treatments listed below:   "  Bolded activities performed today:    therapeutic exercises to develop strength, endurance, and ROM for 10 minutes including:  AAROM shoulder flexion with dowel 10x5"  AAROM shoulder ER with dowel 10x5"  Hook lying lower trunk rotation 10 x 3  Seated trunk rotation 10 x 3    manual therapy techniques: Joint mobilizations and Soft tissue Mobilization were applied to the: neck, right shoulder for 35 minutes, including:  R GH joint passive mobilization  STM to left upper trap, levator scapulae  Soft tissue mobilization to the R pectoral, latissimus and subscapularis muscles  Soft tissue mobilization to the B thoracic paraspinals   Sita passive mobilization to the thoracic spine and rib cage  PROM right shoulder  Manual Cx spine traction      neuromuscular re-education activities to improve: Coordination, Kinesthetic, and Posture for 00 minutes. The following activities were included:  Chin retractions x20  Scapular retractions x20  Shoulder ER and IR isometrics at wall 10x5"    therapeutic activities to improve functional performance for 00  minutes, including:        Patient Education and Home Exercises       Education provided:   - HEP    Home Exercises Provided: Patient instructed to cont prior HEP. Exercises were reviewed and Brett Wayne was able to demonstrate them prior to the end of the session.  Brett Wayne demonstrated good  understanding of the education provided. See EMR under Patient Instructions for exercises provided during therapy sessions    Assessment     Brett Wayne presents to treatment with continued R shoulder stiffness and soreness. She pointed to her R AC joint as a source of pain in the R shoulder today.  She was found to have limited joint mobility throughout the thoracic spine and rib cage that we worked on today in an attempt to allow greater trunk and B shoulder mobility.   Continue to progress as tolerated.     The patient's current job specific task deficits include the following: prolonged " sitting, driving, walking, reaching and lifting.    Brett Jiménez is making Good progress towards meeting her goals.     Patient prognosis is: Good.   Rehab potential is:  Good    Skilled Physical Therapy intervention is required at this time for the injured worker to address the musculoskeletal limitations and work-related functional deficits for their job as a .     Pt's spiritual, cultural and educational needs considered and pt agreeable to plan of care and goals.     Anticipated Barriers for therapy: self-limiting behaviors due to pain    Goals:      Short Term Goals: 3 weeks   Pt will be instructed in an exercise program to address functional deficits related to her neck, upper back and R shoulder Sx.  Decreased pain with ADL's to </= 6/10 at worst  Improve R shoulder ROM to >/= 130 degrees of flexion, 110 degrees of abduction and 30 degrees of internal and external rotation at 90 degrees of abduction.     Long Term Goals: 6 weeks   Pt will return to work full duty, full time.  Pt will be independent in a HEP to assist in managing their R upper quarter Sx.   Decreased pain with ADL's to </= 3/10 at worst  Improve R shoulder ROM to >/= 150 degrees of flexion, 130 degrees of abduction and 45 degrees of internal and external rotation at 90 degrees of abduction.  Pt will report improved functional ability through an improved score on the FOTO Survey.    Plan     Plan of care Certification: 8/23/2023 to 10/23/2023.     Outpatient Physical Therapy 2 times weekly for 6 weeks to include the following interventions: Cervical/Lumbar Traction, Electrical Stimulation as indicated, Manual Therapy, Moist Heat/ Ice, Neuromuscular Re-ed, Patient Education, Self Care, Therapeutic Activities, Therapeutic Exercise, and Dry needling.      Upon discharge from further skilled PT intervention it will determined if the need for a work conditioning program or Functional Capacity Evaluation is required to allow the injured  worker to return to work with full potential achieved, continued improvement with body mechanics with advanced functional activities, and further prevention of future work-related injuries.        Iftikhar Cesar, PT   9/20/2023

## 2023-09-22 ENCOUNTER — CLINICAL SUPPORT (OUTPATIENT)
Dept: REHABILITATION | Facility: HOSPITAL | Age: 58
End: 2023-09-22
Payer: COMMERCIAL

## 2023-09-22 DIAGNOSIS — G89.29 CHRONIC RIGHT SHOULDER PAIN: Primary | ICD-10-CM

## 2023-09-22 DIAGNOSIS — M25.511 CHRONIC RIGHT SHOULDER PAIN: Primary | ICD-10-CM

## 2023-09-22 PROCEDURE — 97140 MANUAL THERAPY 1/> REGIONS: CPT | Mod: PO

## 2023-09-22 NOTE — PROGRESS NOTES
[facility]   Workers' Compensation Physical Therapy Treatment Note      Name: Brett Jernigan [place]  Clinic Number: [id number]    Therapy Diagnosis:   Encounter Diagnosis   Name Primary?    Chronic right shoulder pain Yes     Physician: Lisa Vargas NP    Visit Date: 9/22/2023    Physician Orders: PT Eval and Treat   Medical Diagnosis from Referral:   M25.611 (ICD-10-CM) - Decreased range of motion of right shoulder   S46.001D (ICD-10-CM) - Rotator cuff injury, right, subsequent encounter   S46.811D (ICD-10-CM) - Strain of right trapezius muscle, subsequent encounter   S16.1XXA (ICD-10-CM) - Strain of cervical portion of left trapezius muscle   M54.6 (ICD-10-CM) - Acute bilateral thoracic back pain      Evaluation Date: 8/23/2023  Authorization Period Expiration: 12/31/2023  Plan of Care Expiration: 10/23/2023  Progress Note Due: 9/23/2023  Visit # / Visits authorized: 4/ 20   (# of No Show Appts 0/Number of Cancelled Appts 0)    FOTO: 1/ 3      Precautions: Standard     Time In: 1:05 pm  Time Out: 1:47 pm  Total Billable Time: 42 minutes    PTA: 1/5        Occupation (including job description if provided):   Job Demands: prolonged sitting and arm movements  Current Work Restrictions: out of work  Previous work status: Full time  Current work status: not working  Date last worked (if applicable): 4/09/2023    Subjective     Pt reports: that she had increased pain in her R arm and upper back following her last Tx.  She was compliant with home exercise program.  Response to previous treatment: soreness following treatment  Function: difficulty with cooking, bathing using RUE    Pain: 8/10  Location: right shoulder    Objective      Objective Measures updated at progress report unless specified.     Treatment     Brett Wayne received the treatments listed below:    Bolded activities performed today:    therapeutic exercises to develop strength, endurance, and ROM for 03  "minutes including:  AAROM shoulder flexion with dowel 10x5"  AAROM shoulder ER with dowel 10x5"  Hook lying lower trunk rotation 10 x 3  Seated trunk rotation 10 x 3    manual therapy techniques: Joint mobilizations and Soft tissue Mobilization were applied to the: neck, right shoulder for 39 minutes, including:  R GH joint passive mobilization  STM to left upper trap, levator scapulae  Soft tissue mobilization to the R pectoral, latissimus and subscapularis muscles  Soft tissue mobilization to the B thoracic paraspinals   Sita passive mobilization to the thoracic spine and rib cage  PROM right shoulder  Manual Cx spine traction      neuromuscular re-education activities to improve: Coordination, Kinesthetic, and Posture for 00 minutes. The following activities were included:  Chin retractions x20  Scapular retractions x20  Shoulder ER and IR isometrics at wall 10x5"    therapeutic activities to improve functional performance for 00  minutes, including:        Patient Education and Home Exercises       Education provided:   - HEP    Home Exercises Provided: Patient instructed to cont prior HEP. Exercises were reviewed and Brett Wayne was able to demonstrate them prior to the end of the session.  Brett Wayne demonstrated good  understanding of the education provided. See EMR under Patient Instructions for exercises provided during therapy sessions    Assessment     Brett Wayne presents to treatment with c/o increased pain following her last Tx.  She remains restricted in R R shoulder and thoracic spine joint mobility.  She will continue to benefit from B shoulder, neck and thoracic spine ROM and strengthening activities.  Continue to progress as tolerated.     The patient's current job specific task deficits include the following: prolonged sitting, driving, walking, reaching and lifting.    Brett Wayne Is is making Good progress towards meeting her goals.     Patient prognosis is: Good.   Rehab potential is:  Good    Skilled " Physical Therapy intervention is required at this time for the injured worker to address the musculoskeletal limitations and work-related functional deficits for their job as a .     Pt's spiritual, cultural and educational needs considered and pt agreeable to plan of care and goals.     Anticipated Barriers for therapy: self-limiting behaviors due to pain    Goals:      Short Term Goals: 3 weeks   Pt will be instructed in an exercise program to address functional deficits related to her neck, upper back and R shoulder Sx.  Decreased pain with ADL's to </= 6/10 at worst  Improve R shoulder ROM to >/= 130 degrees of flexion, 110 degrees of abduction and 30 degrees of internal and external rotation at 90 degrees of abduction.     Long Term Goals: 6 weeks   Pt will return to work full duty, full time.  Pt will be independent in a HEP to assist in managing their R upper quarter Sx.   Decreased pain with ADL's to </= 3/10 at worst  Improve R shoulder ROM to >/= 150 degrees of flexion, 130 degrees of abduction and 45 degrees of internal and external rotation at 90 degrees of abduction.  Pt will report improved functional ability through an improved score on the FOTO Survey.    Plan     Plan of care Certification: 8/23/2023 to 10/23/2023.     Outpatient Physical Therapy 2 times weekly for 6 weeks to include the following interventions: Cervical/Lumbar Traction, Electrical Stimulation as indicated, Manual Therapy, Moist Heat/ Ice, Neuromuscular Re-ed, Patient Education, Self Care, Therapeutic Activities, Therapeutic Exercise, and Dry needling.      Upon discharge from further skilled PT intervention it will determined if the need for a work conditioning program or Functional Capacity Evaluation is required to allow the injured worker to return to work with full potential achieved, continued improvement with body mechanics with advanced functional activities, and further prevention of future work-related injuries.         Iftikhar Cesar, PT   9/22/2023

## 2023-09-26 ENCOUNTER — CLINICAL SUPPORT (OUTPATIENT)
Dept: REHABILITATION | Facility: HOSPITAL | Age: 58
End: 2023-09-26
Payer: COMMERCIAL

## 2023-09-26 DIAGNOSIS — G89.29 CHRONIC RIGHT SHOULDER PAIN: Primary | ICD-10-CM

## 2023-09-26 DIAGNOSIS — M25.511 CHRONIC RIGHT SHOULDER PAIN: Primary | ICD-10-CM

## 2023-09-26 PROCEDURE — 97140 MANUAL THERAPY 1/> REGIONS: CPT | Mod: PO,CQ

## 2023-09-26 PROCEDURE — 97112 NEUROMUSCULAR REEDUCATION: CPT | Mod: PO,CQ

## 2023-09-26 PROCEDURE — 97110 THERAPEUTIC EXERCISES: CPT | Mod: PO,CQ

## 2023-09-26 NOTE — PROGRESS NOTES
[facility]   Workers' Compensation Physical Therapy Treatment Note      Name: Brett Jernigan [facility] Number: [id number]    Therapy Diagnosis:   Encounter Diagnosis   Name Primary?    Chronic right shoulder pain Yes       Physician: Lisa Vargas NP    Visit Date: 9/26/2023    Physician Orders: PT Eval and Treat   Medical Diagnosis from Referral:   M25.611 (ICD-10-CM) - Decreased range of motion of right shoulder   S46.001D (ICD-10-CM) - Rotator cuff injury, right, subsequent encounter   S46.811D (ICD-10-CM) - Strain of right trapezius muscle, subsequent encounter   S16.1XXA (ICD-10-CM) - Strain of cervical portion of left trapezius muscle   M54.6 (ICD-10-CM) - Acute bilateral thoracic back pain      Evaluation Date: 8/23/2023  Authorization Period Expiration: 12/31/2023  Plan of Care Expiration: 10/23/2023  Progress Note Due: 9/23/2023  Visit # / Visits authorized: 6/ 20   (# of No Show Appts 0/Number of Cancelled Appts 0)    FOTO: 1/ 3      Precautions: Standard     Time In: 2:45 pm  Time Out: 3:30 pm  Total Billable Time: 45 minutes    PTA: 1/5        Occupation (including job description if provided):   Job Demands: prolonged sitting and arm movements  Current Work Restrictions: out of work  Previous work status: Full time  Current work status: not working  Date last worked (if applicable): 4/09/2023    Subjective     Pt reports: she's been having the pain in her shoulder every day and she also has pain in her hip  She was compliant with home exercise program.  Response to previous treatment: soreness following treatment  Function: difficulty with cooking, bathing using RUE    Pain: 8/10  Location: right shoulder    Objective      Objective Measures updated at progress report unless specified.     Treatment     Brett Wayne received the treatments listed below:      Bolded activities performed today:    therapeutic exercises to develop strength, endurance, and ROM  "for 23 minutes including:  AAROM shoulder flexion with dowel 10x5"  AAROM shoulder ER with dowel 10x5"  Hook lying lower trunk rotation 10 x 3  Seated trunk rotation 10 x 3  Rows with red band x20    manual therapy techniques: Joint mobilizations and Soft tissue Mobilization were applied to the: neck, right shoulder for 12 minutes, including:  R GH joint passive mobilization  STM to right upper trap, levator scapulae  Soft tissue mobilization to the R pectoral, latissimus and subscapularis muscles  Soft tissue mobilization to the B thoracic paraspinals   Sita passive mobilization to the thoracic spine and rib cage  PROM right shoulder  Manual Cx spine traction      neuromuscular re-education activities to improve: Coordination, Kinesthetic, and Posture for 10 minutes. The following activities were included:  Chin retractions x20  Scapular retractions x20  Shoulder ER and IR isometrics at wall 10x5"    therapeutic activities to improve functional performance for 00  minutes, including:        Patient Education and Home Exercises       Education provided:   - HEP    Home Exercises Provided: Patient instructed to cont prior HEP. Exercises were reviewed and Brett Wayne was able to demonstrate them prior to the end of the session.  Brett Wayne demonstrated good  understanding of the education provided. See EMR under Patient Instructions for exercises provided during therapy sessions    Assessment     Brett Wayne presents to treatment with continued reports of pain in her shoulder. She additionally reports having pain in her right hip today. She had muscle guarding during PROM limiting shoulder flexion with reports of pain. She completed exercises today with some discomfort. She was able to progress exercises today including rows with resistance. Continue to progress as tolerated.      The patient's current job specific task deficits include the following: prolonged sitting, driving, walking, reaching and lifting.    Brett Wayne Is is " making Good progress towards meeting her goals.     Patient prognosis is: Good.   Rehab potential is:  Good    Skilled Physical Therapy intervention is required at this time for the injured worker to address the musculoskeletal limitations and work-related functional deficits for their job as a .     Pt's spiritual, cultural and educational needs considered and pt agreeable to plan of care and goals.     Anticipated Barriers for therapy: self-limiting behaviors due to pain    Goals:      Short Term Goals: 3 weeks   Pt will be instructed in an exercise program to address functional deficits related to her neck, upper back and R shoulder Sx.  Decreased pain with ADL's to </= 6/10 at worst  Improve R shoulder ROM to >/= 130 degrees of flexion, 110 degrees of abduction and 30 degrees of internal and external rotation at 90 degrees of abduction.     Long Term Goals: 6 weeks   Pt will return to work full duty, full time.  Pt will be independent in a HEP to assist in managing their R upper quarter Sx.   Decreased pain with ADL's to </= 3/10 at worst  Improve R shoulder ROM to >/= 150 degrees of flexion, 130 degrees of abduction and 45 degrees of internal and external rotation at 90 degrees of abduction.  Pt will report improved functional ability through an improved score on the FOTO Survey.    Plan     Plan of care Certification: 8/23/2023 to 10/23/2023.     Outpatient Physical Therapy 2 times weekly for 6 weeks to include the following interventions: Cervical/Lumbar Traction, Electrical Stimulation as indicated, Manual Therapy, Moist Heat/ Ice, Neuromuscular Re-ed, Patient Education, Self Care, Therapeutic Activities, Therapeutic Exercise, and Dry needling.      Upon discharge from further skilled PT intervention it will determined if the need for a work conditioning program or Functional Capacity Evaluation is required to allow the injured worker to return to work with full potential achieved, continued  improvement with body mechanics with advanced functional activities, and further prevention of future work-related injuries.        Maye Hernadez, PTA   9/26/2023

## 2023-09-27 ENCOUNTER — OFFICE VISIT (OUTPATIENT)
Dept: URGENT CARE | Facility: CLINIC | Age: 58
End: 2023-09-27
Payer: COMMERCIAL

## 2023-09-27 VITALS
DIASTOLIC BLOOD PRESSURE: 83 MMHG | BODY MASS INDEX: 23.32 KG/M2 | SYSTOLIC BLOOD PRESSURE: 137 MMHG | OXYGEN SATURATION: 98 % | WEIGHT: 140 LBS | TEMPERATURE: 98 F | HEART RATE: 88 BPM | HEIGHT: 65 IN

## 2023-09-27 DIAGNOSIS — S29.011D MUSCLE STRAIN OF CHEST WALL, SUBSEQUENT ENCOUNTER: ICD-10-CM

## 2023-09-27 DIAGNOSIS — M75.101 TEAR OF RIGHT SUPRASPINATUS TENDON: ICD-10-CM

## 2023-09-27 DIAGNOSIS — S46.811D STRAIN OF RIGHT TRAPEZIUS MUSCLE, SUBSEQUENT ENCOUNTER: Primary | ICD-10-CM

## 2023-09-27 DIAGNOSIS — Z02.6 ENCOUNTER RELATED TO WORKER'S COMPENSATION CLAIM: ICD-10-CM

## 2023-09-27 PROCEDURE — 99213 PR OFFICE/OUTPT VISIT, EST, LEVL III, 20-29 MIN: ICD-10-PCS | Mod: S$GLB,,, | Performed by: NURSE PRACTITIONER

## 2023-09-27 PROCEDURE — 99213 OFFICE O/P EST LOW 20 MIN: CPT | Mod: S$GLB,,, | Performed by: NURSE PRACTITIONER
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
## 2023-09-28 ENCOUNTER — CLINICAL SUPPORT (OUTPATIENT)
Dept: REHABILITATION | Facility: HOSPITAL | Age: 58
End: 2023-09-28
Payer: COMMERCIAL

## 2023-09-28 DIAGNOSIS — M25.511 CHRONIC RIGHT SHOULDER PAIN: Primary | ICD-10-CM

## 2023-09-28 DIAGNOSIS — G89.29 CHRONIC RIGHT SHOULDER PAIN: Primary | ICD-10-CM

## 2023-09-28 PROCEDURE — 97140 MANUAL THERAPY 1/> REGIONS: CPT | Mod: PO

## 2023-09-28 NOTE — PROGRESS NOTES
"[facility]   Workers' Compensation Physical Therapy Treatment Note      Name: Brett Jernigan Lund  Clinic Number: [id number]    Therapy Diagnosis:   Encounter Diagnosis   Name Primary?    Chronic right shoulder pain Yes       Physician: Lisa Vargas NP    Visit Date: 9/28/2023    Physician Orders: PT Eval and Treat   Medical Diagnosis from Referral:   M25.611 (ICD-10-CM) - Decreased range of motion of right shoulder   S46.001D (ICD-10-CM) - Rotator cuff injury, right, subsequent encounter   S46.811D (ICD-10-CM) - Strain of right trapezius muscle, subsequent encounter   S16.1XXA (ICD-10-CM) - Strain of cervical portion of left trapezius muscle   M54.6 (ICD-10-CM) - Acute bilateral thoracic back pain      Evaluation Date: 8/23/2023  Authorization Period Expiration: 12/31/2023  Plan of Care Expiration: 10/23/2023  Progress Note Due: 9/23/2023  Visit # / Visits authorized: 7/ 20   (# of No Show Appts 0/Number of Cancelled Appts 0)    FOTO: 1/ 3      Precautions: Standard     Time In: 2:45 pm  Time Out: 3:30 pm  Total Billable Time: 45 minutes    PTA: 1/5        Occupation (including job description if provided):   Job Demands: prolonged sitting and arm movements  Current Work Restrictions: out of work  Previous work status: Full time  Current work status: not working  Date last worked (if applicable): 4/09/2023    Subjective     Pt reports: that her Sx have not changed. She reports pain in her chest and feeling that "an arrow" is going through her.  She was compliant with home exercise program.  Response to previous treatment: soreness following treatment  Function: difficulty with cooking, bathing using RUE    Pain: 8/10  Location: right shoulder    Objective      Objective Measures updated at progress report unless specified.   Observation: Pt was able to enter the clinic ambulating independently without an assistive device.      Posture: R side of pelvis elevated   " "  Cervical Range of Motion:     Degrees Pain   Flexion WFL OK      Extension 60% OK      Right Rotation 60% Tight L       Left Rotation 70% Tight R      Right Side Bending 50% Sore R tight L   Left Side Bending 25% Tight R      Shoulder Range of Motion:   Shoulder Left Right   Flexion 140 110   Abduction 95 90   ER 70 75 at 45 degrees of abduction   IR 49 To abdomin at 45 degrees of abduction        Special Tests:  Distraction (+)   Compression nt   Spurlings nt   Sharp-Darinel (-)   VA test nt   Lateral Flexion Alar Ligament (-)   DNF test nt         Joint Mobility: Hypomobile R GH joint,    PA's nt,    Transverse glides hypomobile,       Thoracic mobility: hypomobile      Palpation: R upper trap, scalenes, SCM       Sensation: intact     Flexibility: limited in Cx spine musculature   Functional Job Specific Testing:      Job Specific Task Job Demands Current Ability Deficit? (Yes or No)   1. siting Prolong sitting 4-5 hours 3-4 hours yes   2. reaching   painful deficit   3. lifting   painful geoffrey   (Testing should not be performed beyond that of the patient's job demands.)     Limitation/Restriction for FOTO  Survey     The[facility]   FOTO documents entered into [employer] - see Media section.     Limitation Score: 47%        Treatment     Shelly received the treatments listed below:      Bolded activities performed today:    therapeutic exercises to develop strength, endurance, and ROM for 23 minutes including:  AAROM shoulder flexion with dowel 10x5"  AAROM shoulder ER with dowel 10x5"  Hook lying lower trunk rotation 10 x 3  Seated trunk rotation 10 x 3  Rows with red band x20    manual therapy techniques: Joint mobilizations and Soft tissue Mobilization were applied to the: neck, right shoulder for 45 minutes, including:  R GH joint passive mobilization  STM to right upper trap, levator scapulae  Soft tissue mobilization to the R pectoral, latissimus and " "subscapularis muscles  Soft tissue mobilization to the B thoracic paraspinals   Sita passive mobilization to the thoracic spine and rib cage  PROM right shoulder  Manual Cx spine traction      neuromuscular re-education activities to improve: Coordination, Kinesthetic, and Posture for 00 minutes. The following activities were included:  Chin retractions x20  Scapular retractions x20  Shoulder ER and IR isometrics at wall 10x5"    therapeutic activities to improve functional performance for 00  minutes, including:        Patient Education and Home Exercises       Education provided:   - HEP    Home Exercises Provided: Patient instructed to cont prior HEP. Exercises were reviewed and Brett Wayne was able to demonstrate them prior to the end of the session.  Brett Wayne demonstrated good  understanding of the education provided. See EMR under Patient Instructions for exercises provided during therapy sessions    Assessment     Brett Wayne presents to treatment reporting no change in her pain level.  She exhibits improved Cx spine AROM and R shoulder passive ROM. She was able to tolerate passive joint and soft tissue mobilization of the thoracic region better today and reported a decrease in her overall pain level following Tx. Continue to progress as tolerated.      The patient's current job specific task deficits include the following: prolonged sitting, driving, walking, reaching and lifting.    Brett Wayne Is is making Good progress towards meeting her goals.     Patient prognosis is: Good.   Rehab potential is:  Good    Skilled Physical Therapy intervention is required at this time for the injured worker to address the musculoskeletal limitations and work-related functional deficits for their job as a .     Pt's spiritual, cultural and educational needs considered and pt agreeable to plan of care and goals.     Anticipated Barriers for therapy: self-limiting behaviors due to pain    Goals:      Short Term Goals: 3 " weeks   Pt will be instructed in an exercise program to address functional deficits related to her neck, upper back and R shoulder Sx.  Decreased pain with ADL's to </= 6/10 at worst  Improve R shoulder ROM to >/= 130 degrees of flexion, 110 degrees of abduction and 30 degrees of internal and external rotation at 90 degrees of abduction.     Long Term Goals: 6 weeks   Pt will return to work full duty, full time.  Pt will be independent in a HEP to assist in managing their R upper quarter Sx.   Decreased pain with ADL's to </= 3/10 at worst  Improve R shoulder ROM to >/= 150 degrees of flexion, 130 degrees of abduction and 45 degrees of internal and external rotation at 90 degrees of abduction.  Pt will report improved functional ability through an improved score on the FOTO Survey.    Plan     Plan of care Certification: 8/23/2023 to 10/23/2023.     Outpatient Physical Therapy 2 times weekly for 6 weeks to include the following interventions: Cervical/Lumbar Traction, Electrical Stimulation as indicated, Manual Therapy, Moist Heat/ Ice, Neuromuscular Re-ed, Patient Education, Self Care, Therapeutic Activities, Therapeutic Exercise, and Dry needling.      Upon discharge from further skilled PT intervention it will determined if the need for a work conditioning program or Functional Capacity Evaluation is required to allow the injured worker to return to work with full potential achieved, continued improvement with body mechanics with advanced functional activities, and further prevention of future work-related injuries.        Iftikhar Cesar, PT   9/28/2023

## 2023-10-04 ENCOUNTER — CLINICAL SUPPORT (OUTPATIENT)
Dept: REHABILITATION | Facility: HOSPITAL | Age: 58
End: 2023-10-04
Payer: COMMERCIAL

## 2023-10-04 DIAGNOSIS — G89.29 CHRONIC RIGHT SHOULDER PAIN: Primary | ICD-10-CM

## 2023-10-04 DIAGNOSIS — M25.511 CHRONIC RIGHT SHOULDER PAIN: Primary | ICD-10-CM

## 2023-10-04 PROCEDURE — 97112 NEUROMUSCULAR REEDUCATION: CPT | Mod: PO

## 2023-10-04 PROCEDURE — 97140 MANUAL THERAPY 1/> REGIONS: CPT | Mod: PO

## 2023-10-04 PROCEDURE — 97110 THERAPEUTIC EXERCISES: CPT | Mod: PO

## 2023-10-04 NOTE — PROGRESS NOTES
[facility]   Workers' Compensation Physical Therapy Treatment / Progress Note      Name: Brett Jernigan Wanchese  Clinic Number: [id number]    Therapy Diagnosis:   Encounter Diagnosis   Name Primary?    Chronic right shoulder pain Yes       Physician: Lisa Vargas NP    Visit Date: 10/4/2023    Physician Orders: PT Eval and Treat   Medical Diagnosis from Referral:   M25.611 (ICD-10-CM) - Decreased range of motion of right shoulder   S46.001D (ICD-10-CM) - Rotator cuff injury, right, subsequent encounter   S46.811D (ICD-10-CM) - Strain of right trapezius muscle, subsequent encounter   S16.1XXA (ICD-10-CM) - Strain of cervical portion of left trapezius muscle   M54.6 (ICD-10-CM) - Acute bilateral thoracic back pain      Evaluation Date: 8/23/2023  Authorization Period Expiration: 12/31/2023  Plan of Care Expiration: 10/23/2023  Progress Note Due: 11/4/2023  Visit # / Visits authorized: 8/ 20   (# of No Show Appts 0/Number of Cancelled Appts 0)    FOTO: 1/ 3      Precautions: Standard     Time In: 4:00 pm  Time Out: 4:40 pm  Total Billable Time: 40 minutes    PTA: 1/5    Occupation (including job description if provided):   Job Demands: prolonged sitting and arm movements  Current Work Restrictions: out of work  Previous work status: Full time  Current work status: not working  Date last worked (if applicable): 4/09/2023    Subjective     Pt reports: some days are okay and some are not. She has a FU next week with her doctor.   She was compliant with home exercise program.  Response to previous treatment: ongoing   Function: difficulty with cooking, bathing using RUE    Pain: 8/10  Location: right shoulder    Objective      Objective Measures updated at progress report unless specified.     UPDATED 10/4/2023      Shoulder Range of Motion:   Shoulder Left Right   Flexion 140 153   Abduction 95 112   ER 70 70 at 45 degrees of abduction   IR 49 To abdomin at 45 degrees of  "abduction        Joint Mobility: not tested      Functional Job Specific Testing:      Job Specific Task Job Demands Current Ability Deficit? (Yes or No)   1. siting Prolong sitting 4-5 hours 3-4 hours yes   2. reaching   painful deficit   3. lifting   painful geoffrey   (Testing should not be performed beyond that of the patient's job demands.)     Limitation/Restriction for FOTO  Survey     Therapist reviewed FOTO scores for Brett Jernigan Simmons on 9/14/2023.   FOTO documents entered into [employer] - see Media section.     Limitation Score: 47%        Treatment     Brett Wayne received the treatments listed below:      Bolded activities performed today:    therapeutic exercises to develop strength, endurance, and ROM for 10 minutes including:  AAROM shoulder flexion with dowel 10x5"  AAROM shoulder ER with dowel 10x5"  Hook lying lower trunk rotation 10 x 3  Seated trunk rotation 10 x 3  Rows with green band 3x 10   +objective testing    manual therapy techniques: Joint mobilizations and Soft tissue Mobilization were applied to the: neck, right shoulder for 20 minutes, including:  R GH joint passive mobilization  STM to right upper trap, levator scapulae  Soft tissue mobilization to the R pectoral, latissimus and subscapularis muscles  Soft tissue mobilization to the B thoracic paraspinals   Sita passive mobilization to the thoracic spine and rib cage  PROM right shoulder  Manual Cx spine traction    PROM/AAROM R shoulder   STM R UT      Time + 10 mins ICE, R shoulder     neuromuscular re-education activities to improve: Coordination, Kinesthetic, and Posture for 10 minutes. The following activities were included:  Chin retractions x20  Scapular retractions x20  Shoulder ER and IR isometrics at wall 10x5"    therapeutic activities to improve functional performance for 00  minutes, including:        Patient Education and Home Exercises       Education provided:   - HEP verbalized     Home Exercises Provided: Patient " instructed to cont prior HEP. Exercises were reviewed and Brett Wayne was able to demonstrate them prior to the end of the session.  Brett Wayne demonstrated good  understanding of the education provided. See EMR under Patient Instructions for exercises provided during therapy sessions    Assessment     At progress note, pt has been seen at physical therapy for 8 visits. She demonstrates mild right shoulder active range of motion and strength improvements. Mild deficits are still noted vs left. Pt is reporting average pain levels 8/10 and changes based off of activity. She reports she is still limited with tasks around the house. Pt will continue to be progressed to decrease her pain and improve her right shoulder strength.     The patient's current job specific task deficits include the following: prolonged sitting, driving, walking, reaching and lifting.    Brett Wayne Is is making Good progress towards meeting her goals.     Patient prognosis is: Good.   Rehab potential is:  Good    Skilled Physical Therapy intervention is required at this time for the injured worker to address the musculoskeletal limitations and work-related functional deficits for their job as a .     Pt's spiritual, cultural and educational needs considered and pt agreeable to plan of care and goals.     Anticipated Barriers for therapy: self-limiting behaviors due to pain    Goals:      Short Term Goals: 3 weeks  ONGOING   Pt will be instructed in an exercise program to address functional deficits related to her neck, upper back and R shoulder Sx.  Decreased pain with ADL's to </= 6/10 at worst  Improve R shoulder ROM to >/= 130 degrees of flexion, 110 degrees of abduction and 30 degrees of internal and external rotation at 90 degrees of abduction.     Long Term Goals: 6 weeks  ONGOING   Pt will return to work full duty, full time.  Pt will be independent in a HEP to assist in managing their R upper quarter Sx.   Decreased pain with ADL's to  </= 3/10 at worst  Improve R shoulder ROM to >/= 150 degrees of flexion, 130 degrees of abduction and 45 degrees of internal and external rotation at 90 degrees of abduction.  Pt will report improved functional ability through an improved score on the FOTO Survey.    Plan     Plan of care Certification: 8/23/2023 to 10/23/2023.     Outpatient Physical Therapy 2 times weekly for 6 weeks to include the following interventions: Cervical/Lumbar Traction, Electrical Stimulation as indicated, Manual Therapy, Moist Heat/ Ice, Neuromuscular Re-ed, Patient Education, Self Care, Therapeutic Activities, Therapeutic Exercise, and Dry needling.      Upon discharge from further skilled PT intervention it will determined if the need for a work conditioning program or Functional Capacity Evaluation is required to allow the injured worker to return to work with full potential achieved, continued improvement with body mechanics with advanced functional activities, and further prevention of future work-related injuries.        Stanley Rojas, PT   10/4/2023

## 2023-10-04 NOTE — PROGRESS NOTES
"[facility]   Workers' Compensation Physical Therapy Treatment Note      Name: Brett Jernigan Gilberts  Clinic Number: [id number]    Therapy Diagnosis:   No diagnosis found.      Physician: Lisa Vargas NP    Visit Date: 10/4/2023    Physician Orders: PT Eval and Treat   Medical Diagnosis from Referral:   M25.611 (ICD-10-CM) - Decreased range of motion of right shoulder   S46.001D (ICD-10-CM) - Rotator cuff injury, right, subsequent encounter   S46.811D (ICD-10-CM) - Strain of right trapezius muscle, subsequent encounter   S16.1XXA (ICD-10-CM) - Strain of cervical portion of left trapezius muscle   M54.6 (ICD-10-CM) - Acute bilateral thoracic back pain      Evaluation Date: 8/23/2023  Authorization Period Expiration: 12/31/2023  Plan of Care Expiration: 10/23/2023  Progress Note Due: 9/23/2023  Visit # / Visits authorized: 7/ 20 ***  (# of No Show Appts 0/Number of Cancelled Appts 0)    FOTO: 1/ 3      Precautions: Standard     Time In: ***  Time Out: ***  Total Billable Time: *** minutes    PTA: 1/5        Occupation (including job description if provided):   Job Demands: prolonged sitting and arm movements  Current Work Restrictions: out of work  Previous work status: Full time  Current work status: not working  Date last worked (if applicable): 4/09/2023    Subjective     Pt reports: that her Sx have not changed. She reports pain in her chest and feeling that "an arrow" is going through her. ***  She was compliant with home exercise program.  Response to previous treatment: soreness following treatment  Function: difficulty with cooking, bathing using RUE    Pain: 8/10  Location: right shoulder    Objective      Objective Measures updated at progress report unless specified.     Treatment     Brett Wayne received the treatments listed below:      Bolded activities performed today:    therapeutic exercises to develop strength, endurance, and ROM for *** minutes " "including:  AAROM shoulder flexion with dowel 10x5"  AAROM shoulder ER with dowel 10x5"  Hook lying lower trunk rotation 10 x 3  Seated trunk rotation 10 x 3  Rows with red band x20    manual therapy techniques: Joint mobilizations and Soft tissue Mobilization were applied to the: neck, right shoulder for *** minutes, including:  R GH joint passive mobilization  STM to right upper trap, levator scapulae  Soft tissue mobilization to the R pectoral, latissimus and subscapularis muscles  Soft tissue mobilization to the B thoracic paraspinals   Sita passive mobilization to the thoracic spine and rib cage  PROM right shoulder  Manual Cx spine traction      neuromuscular re-education activities to improve: Coordination, Kinesthetic, and Posture for *** minutes. The following activities were included:  Chin retractions x20  Scapular retractions x20  Shoulder ER and IR isometrics at wall 10x5"    therapeutic activities to improve functional performance for 00  minutes, including:        Patient Education and Home Exercises       Education provided:   - HEP    Home Exercises Provided: Patient instructed to cont prior HEP. Exercises were reviewed and Brett Wayne was able to demonstrate them prior to the end of the session.  Brett Wayne demonstrated good  understanding of the education provided. See EMR under Patient Instructions for exercises provided during therapy sessions    Assessment     ***  Brett Wayne presents to treatment reporting no change in her pain level.  She exhibits improved Cx spine AROM and R shoulder passive ROM. She was able to tolerate passive joint and soft tissue mobilization of the thoracic region better today and reported a decrease in her overall pain level following Tx. Continue to progress as tolerated.      The patient's current job specific task deficits include the following: prolonged sitting, driving, walking, reaching and lifting.    Brett Wayne Is is making Good progress towards meeting her goals. "     Patient prognosis is: Good.   Rehab potential is:  Good    Skilled Physical Therapy intervention is required at this time for the injured worker to address the musculoskeletal limitations and work-related functional deficits for their job as a .     Pt's spiritual, cultural and educational needs considered and pt agreeable to plan of care and goals.     Anticipated Barriers for therapy: self-limiting behaviors due to pain    Goals:      Short Term Goals: 3 weeks   Pt will be instructed in an exercise program to address functional deficits related to her neck, upper back and R shoulder Sx.  Decreased pain with ADL's to </= 6/10 at worst  Improve R shoulder ROM to >/= 130 degrees of flexion, 110 degrees of abduction and 30 degrees of internal and external rotation at 90 degrees of abduction.     Long Term Goals: 6 weeks   Pt will return to work full duty, full time.  Pt will be independent in a HEP to assist in managing their R upper quarter Sx.   Decreased pain with ADL's to </= 3/10 at worst  Improve R shoulder ROM to >/= 150 degrees of flexion, 130 degrees of abduction and 45 degrees of internal and external rotation at 90 degrees of abduction.  Pt will report improved functional ability through an improved score on the FOTO Survey.    Plan     Plan of care Certification: 8/23/2023 to 10/23/2023.     Outpatient Physical Therapy 2 times weekly for 6 weeks to include the following interventions: Cervical/Lumbar Traction, Electrical Stimulation as indicated, Manual Therapy, Moist Heat/ Ice, Neuromuscular Re-ed, Patient Education, Self Care, Therapeutic Activities, Therapeutic Exercise, and Dry needling.      Upon discharge from further skilled PT intervention it will determined if the need for a work conditioning program or Functional Capacity Evaluation is required to allow the injured worker to return to work with full potential achieved, continued improvement with body mechanics with advanced  functional activities, and further prevention of future work-related injuries.        Maye Hernadez, PTA   10/4/2023

## 2023-10-06 ENCOUNTER — CLINICAL SUPPORT (OUTPATIENT)
Dept: REHABILITATION | Facility: HOSPITAL | Age: 58
End: 2023-10-06
Payer: COMMERCIAL

## 2023-10-06 DIAGNOSIS — G89.29 CHRONIC RIGHT SHOULDER PAIN: Primary | ICD-10-CM

## 2023-10-06 DIAGNOSIS — M25.511 CHRONIC RIGHT SHOULDER PAIN: Primary | ICD-10-CM

## 2023-10-06 PROCEDURE — 97140 MANUAL THERAPY 1/> REGIONS: CPT | Mod: PO

## 2023-10-06 PROCEDURE — 97110 THERAPEUTIC EXERCISES: CPT | Mod: PO

## 2023-10-06 PROCEDURE — 97112 NEUROMUSCULAR REEDUCATION: CPT | Mod: PO

## 2023-10-06 NOTE — PROGRESS NOTES
[facility]   Workers' Compensation Physical Therapy Treatment / Progress Note      Name: Brett Jernigan Otto  Clinic Number: [id number]    Therapy Diagnosis:   Encounter Diagnosis   Name Primary?    Chronic right shoulder pain Yes         Physician: Lisa Vargas NP    Visit Date: 10/6/2023    Physician Orders: PT Eval and Treat   Medical Diagnosis from Referral:   M25.611 (ICD-10-CM) - Decreased range of motion of right shoulder   S46.001D (ICD-10-CM) - Rotator cuff injury, right, subsequent encounter   S46.811D (ICD-10-CM) - Strain of right trapezius muscle, subsequent encounter   S16.1XXA (ICD-10-CM) - Strain of cervical portion of left trapezius muscle   M54.6 (ICD-10-CM) - Acute bilateral thoracic back pain      Evaluation Date: 2023  Authorization Period Expiration: 2023  Plan of Care Expiration: 10/23/2023  Progress Note Due: 2023  Visit # / Visits authorized:    (# of No Show Appts 0/Number of Cancelled Appts 0)    FOTO: 1/ 3      Precautions: Standard     Time In: 1:52 pm  Time Out: 2:35 pm  Total Billable Time: 43 minutes    PTA:     Occupation (including job description if provided):   Job Demands: prolonged sitting and arm movements  Current Work Restrictions: out of work  Previous work status: Full time  Current work status: not working  Date last worked (if applicable): 2023    Subjective     Pt reports: Pt reported that she is not feeling very well today as it is her  family memebers birthday and she lost her job yesterday  She was compliant with home exercise program.  Response to previous treatment: ongoing   Function: difficulty with cooking, bathing using RUE    Pain: 8/10  Location: right shoulder    Objective      Objective Measures updated at progress report unless specified.    R shoulder PROM  Flexion   160 degrees  Abduction  130 degrees  Internal rotation 50 degrees  External rotation 85  "degrees    Limitation/Restriction for FOTO  Survey     Therapist reviewed FOTO scores for Brett Simmons on 9/14/2023.   FOTO documents entered into EPIC - see Media section.     Limitation Score: 47%        Treatment     Brett Wayne received the treatments listed below:      Bolded activities performed today:    therapeutic exercises to develop strength, endurance, and ROM for 10 minutes including:  AAROM shoulder flexion with dowel 10x5"  AAROM shoulder ER with dowel 10x5"  Hook lying lower trunk rotation 10 x 3  Seated trunk rotation 10 x 3  Rows with green band 3x 10   +objective testing    manual therapy techniques: Joint mobilizations and Soft tissue Mobilization were applied to the: neck, right shoulder for 25 minutes, including:  R GH joint passive mobilization  STM to right upper trap, levator scapulae  Soft tissue mobilization to the R pectoral, latissimus and subscapularis muscles  Soft tissue mobilization to the B thoracic paraspinals   Sita passive mobilization to the thoracic spine and rib cage  PROM right shoulder  Manual Cx spine traction      Time + 10 mins ICE, R shoulder     neuromuscular re-education activities to improve: Coordination, Kinesthetic, and Posture for 10 minutes. The following activities were included:  Chin retractions x20  Scapular retractions x20  Shoulder shrugs x 20  Shoulder ER and IR isometrics at wall 10x5"    therapeutic activities to improve functional performance for 00  minutes, including:        Patient Education and Home Exercises       Education provided:   - HEP verbalized     Home Exercises Provided: Patient instructed to cont prior HEP. Exercises were reviewed and Brett Wayne was able to demonstrate them prior to the end of the session.  Brett Wayne demonstrated good  understanding of the education provided. See EMR under Patient Instructions for exercises provided during therapy sessions    Assessment     Pt with significant improvement in R should PROM as " compared to her last visit including a 20 degree improvement in flexion and 35 degrees improvement in abduction ROM.  One explanation for the noted improvement would be that her last measurements were made actively vs passively as they were measured today.  Pt appeared to tolerate today's Tx well, although she continues to report 8/10 pain in her R shoulder.  Continue to progress R shoulder AROM, strength and function.   The patient's current job specific task deficits include the following: prolonged sitting, driving, walking, reaching and lifting.    Brett Jiménez is making Good progress towards meeting her goals.     Patient prognosis is: Good.   Rehab potential is:  Good    Skilled Physical Therapy intervention is required at this time for the injured worker to address the musculoskeletal limitations and work-related functional deficits for their job as a .     Pt's spiritual, cultural and educational needs considered and pt agreeable to plan of care and goals.     Anticipated Barriers for therapy: self-limiting behaviors due to pain    Goals:      Short Term Goals: 3 weeks  ONGOING   Pt will be instructed in an exercise program to address functional deficits related to her neck, upper back and R shoulder Sx.  Decreased pain with ADL's to </= 6/10 at worst  Improve R shoulder ROM to >/= 130 degrees of flexion, 110 degrees of abduction and 30 degrees of internal and external rotation at 90 degrees of abduction.     Long Term Goals: 6 weeks  ONGOING   Pt will return to work full duty, full time.  Pt will be independent in a HEP to assist in managing their R upper quarter Sx.   Decreased pain with ADL's to </= 3/10 at worst  Improve R shoulder ROM to >/= 150 degrees of flexion, 130 degrees of abduction and 45 degrees of internal and external rotation at 90 degrees of abduction.  Pt will report improved functional ability through an improved score on the FOTO Survey.    Plan     Plan of care Certification:  8/23/2023 to 10/23/2023.     Outpatient Physical Therapy 2 times weekly for 6 weeks to include the following interventions: Cervical/Lumbar Traction, Electrical Stimulation as indicated, Manual Therapy, Moist Heat/ Ice, Neuromuscular Re-ed, Patient Education, Self Care, Therapeutic Activities, Therapeutic Exercise, and Dry needling.      Upon discharge from further skilled PT intervention it will determined if the need for a work conditioning program or Functional Capacity Evaluation is required to allow the injured worker to return to work with full potential achieved, continued improvement with body mechanics with advanced functional activities, and further prevention of future work-related injuries.        Iftikhar Cesar, PT   10/10/2023

## 2023-10-10 ENCOUNTER — CLINICAL SUPPORT (OUTPATIENT)
Dept: REHABILITATION | Facility: HOSPITAL | Age: 58
End: 2023-10-10
Payer: COMMERCIAL

## 2023-10-10 DIAGNOSIS — G89.29 CHRONIC RIGHT SHOULDER PAIN: Primary | ICD-10-CM

## 2023-10-10 DIAGNOSIS — M25.511 CHRONIC RIGHT SHOULDER PAIN: Primary | ICD-10-CM

## 2023-10-10 PROCEDURE — 97112 NEUROMUSCULAR REEDUCATION: CPT | Mod: PO,CQ

## 2023-10-10 PROCEDURE — 97110 THERAPEUTIC EXERCISES: CPT | Mod: PO,CQ

## 2023-10-10 PROCEDURE — 97140 MANUAL THERAPY 1/> REGIONS: CPT | Mod: PO,CQ

## 2023-10-10 NOTE — PROGRESS NOTES
[facility]   Workers' Compensation Physical Therapy Treatment      Name: Brett Jernigan Frederick  Clinic Number: [id number]    Therapy Diagnosis:   Encounter Diagnosis   Name Primary?    Chronic right shoulder pain Yes           Physician: Lisa Vargas NP    Visit Date: 10/10/2023    Physician Orders: PT Eval and Treat   Medical Diagnosis from Referral:   M25.611 (ICD-10-CM) - Decreased range of motion of right shoulder   S46.001D (ICD-10-CM) - Rotator cuff injury, right, subsequent encounter   S46.811D (ICD-10-CM) - Strain of right trapezius muscle, subsequent encounter   S16.1XXA (ICD-10-CM) - Strain of cervical portion of left trapezius muscle   M54.6 (ICD-10-CM) - Acute bilateral thoracic back pain      Evaluation Date: 8/23/2023  Authorization Period Expiration: 12/31/2023  Plan of Care Expiration: 10/23/2023  Progress Note Due: 11/4/2023  Visit # / Visits authorized: 10/ 12  (# of No Show Appts 0/Number of Cancelled Appts 0)    FOTO: 1/ 3      Precautions: Standard     Time In: 2:40 pm  Time Out: 3:30 pm  Total Billable Time: 50 minutes    PTA: 1/5    Occupation (including job description if provided):   Job Demands: prolonged sitting and arm movements  Current Work Restrictions: out of work  Previous work status: Full time  Current work status: not working  Date last worked (if applicable): 4/09/2023    Subjective     Pt reports: she's still having a lot of pain in her shoulder  She was compliant with home exercise program.  Response to previous treatment: soreness in shoulder  Function: difficulty with cooking, bathing using RUE    Pain: 8/10  Location: right shoulder    Objective      Objective Measures updated at progress report unless specified.     Treatment     Brett Wayne received the treatments listed below:      Bolded activities performed today:    therapeutic exercises to develop strength, endurance, and ROM for 10 minutes including:  AAROM shoulder flexion  "with dowel 10x5"  AAROM shoulder ER with dowel 10x5"  Hook lying lower trunk rotation 10 x 3  Seated trunk rotation 10 x 3  Rows with green band 3x 10   +objective testing    manual therapy techniques: Joint mobilizations and Soft tissue Mobilization were applied to the: neck, right shoulder for 30 minutes, including:  R GH joint passive mobilization  STM to right upper trap, levator scapulae  Soft tissue mobilization to the R pectoral, latissimus and subscapularis muscles  Soft tissue mobilization to the B thoracic paraspinals   Sita passive mobilization to the thoracic spine and rib cage  PROM right shoulder  Manual Cx spine traction      neuromuscular re-education activities to improve: Coordination, Kinesthetic, and Posture for 10 minutes. The following activities were included:  Chin retractions x20  Scapular retractions x20  Shoulder shrugs x 20  Shoulder ER and IR isometrics at wall 10x5"    therapeutic activities to improve functional performance for 00  minutes, including:        Patient Education and Home Exercises       Education provided:   - HEP verbalized     Home Exercises Provided: Patient instructed to cont prior HEP. Exercises were reviewed and Brett Wayne was able to demonstrate them prior to the end of the session.  Brett Wayne demonstrated good  understanding of the education provided. See EMR under Patient Instructions for exercises provided during therapy sessions    Assessment     Brett Wayne presents to treatment with continued pain in her shoulder. Increased tension and muscle guarding noted throughout session. She had fair tolerance to exercises with discomfort noted throughout. She was instructed in completing exercises to pain tolerance. Continue to progress R shoulder AROM, strength and function.     The patient's current job specific task deficits include the following: prolonged sitting, driving, walking, reaching and lifting.    Brett Wayne Is is making Good progress towards meeting her goals. "     Patient prognosis is: Good.   Rehab potential is:  Good    Skilled Physical Therapy intervention is required at this time for the injured worker to address the musculoskeletal limitations and work-related functional deficits for their job as a .     Pt's spiritual, cultural and educational needs considered and pt agreeable to plan of care and goals.     Anticipated Barriers for therapy: self-limiting behaviors due to pain    Goals:      Short Term Goals: 3 weeks  ONGOING   Pt will be instructed in an exercise program to address functional deficits related to her neck, upper back and R shoulder Sx.  Decreased pain with ADL's to </= 6/10 at worst  Improve R shoulder ROM to >/= 130 degrees of flexion, 110 degrees of abduction and 30 degrees of internal and external rotation at 90 degrees of abduction.     Long Term Goals: 6 weeks  ONGOING   Pt will return to work full duty, full time.  Pt will be independent in a HEP to assist in managing their R upper quarter Sx.   Decreased pain with ADL's to </= 3/10 at worst  Improve R shoulder ROM to >/= 150 degrees of flexion, 130 degrees of abduction and 45 degrees of internal and external rotation at 90 degrees of abduction.  Pt will report improved functional ability through an improved score on the FOTO Survey.    Plan     Plan of care Certification: 8/23/2023 to 10/23/2023.     Outpatient Physical Therapy 2 times weekly for 6 weeks to include the following interventions: Cervical/Lumbar Traction, Electrical Stimulation as indicated, Manual Therapy, Moist Heat/ Ice, Neuromuscular Re-ed, Patient Education, Self Care, Therapeutic Activities, Therapeutic Exercise, and Dry needling.      Upon discharge from further skilled PT intervention it will determined if the need for a work conditioning program or Functional Capacity Evaluation is required to allow the injured worker to return to work with full potential achieved, continued improvement with body mechanics with  advanced functional activities, and further prevention of future work-related injuries.        Maye Hernadez, PTA   10/10/2023

## 2023-10-12 ENCOUNTER — CLINICAL SUPPORT (OUTPATIENT)
Dept: REHABILITATION | Facility: HOSPITAL | Age: 58
End: 2023-10-12
Payer: COMMERCIAL

## 2023-10-12 DIAGNOSIS — G89.29 CHRONIC RIGHT SHOULDER PAIN: Primary | ICD-10-CM

## 2023-10-12 DIAGNOSIS — M25.511 CHRONIC RIGHT SHOULDER PAIN: Primary | ICD-10-CM

## 2023-10-12 PROCEDURE — 97110 THERAPEUTIC EXERCISES: CPT | Mod: PO

## 2023-10-12 PROCEDURE — 97112 NEUROMUSCULAR REEDUCATION: CPT | Mod: PO

## 2023-10-12 PROCEDURE — 97140 MANUAL THERAPY 1/> REGIONS: CPT | Mod: PO

## 2023-10-12 NOTE — PROGRESS NOTES
"[facility]   Workers' Compensation Physical Therapy Treatment      Name: Brett Jernigan Tippecanoe  Clinic Number: [id number]    Therapy Diagnosis:   No diagnosis found.          Physician: Lisa Vargas NP    Visit Date: 10/12/2023    Physician Orders: PT Eval and Treat   Medical Diagnosis from Referral:   M25.611 (ICD-10-CM) - Decreased range of motion of right shoulder   S46.001D (ICD-10-CM) - Rotator cuff injury, right, subsequent encounter   S46.811D (ICD-10-CM) - Strain of right trapezius muscle, subsequent encounter   S16.1XXA (ICD-10-CM) - Strain of cervical portion of left trapezius muscle   M54.6 (ICD-10-CM) - Acute bilateral thoracic back pain      Evaluation Date: 8/23/2023  Authorization Period Expiration: 12/31/2023  Plan of Care Expiration: 10/23/2023  Progress Note Due: 11/4/2023  Visit # / Visits authorized: 10/ 12  (# of No Show Appts 0/Number of Cancelled Appts 0)    FOTO: 1/ 3      Precautions: Standard     Time In: 3:45 pm  Time Out: 4:30 pm  Total Billable Time: 50 minutes    PTA: 1/5    Occupation (including job description if provided):   Job Demands: prolonged sitting and arm movements  Current Work Restrictions: out of work  Previous work status: Full time  Current work status: not working  Date last worked (if applicable): 4/09/2023    Subjective     Pt reports: that she feel's the same  She was compliant with home exercise program.  Response to previous treatment: soreness in shoulder  Function: difficulty with cooking, bathing using RUE    Pain: 8/10  Location: right shoulder    Objective      Objective Measures updated at progress report unless specified.     Treatment     Brett Wayne received the treatments listed below:      Bolded activities performed today:    therapeutic exercises to develop strength, endurance, and ROM for 23 minutes including:  AAROM shoulder flexion with dowel 10x5"  B shoulder pro/retraction 20 x 2 with ball on the plinth  B " Erp at bedside    "shoulder IR/ER 20 x 2 with ball on the plinth (side to side)  AAROM shoulder ER with dowel 10x5"  Hook lying lower trunk rotation 10 x 3  Seated trunk rotation 10 x 3  Rows with green band x20   B shoulder extension with green band x20  +objective testing    manual therapy techniques: Joint mobilizations and Soft tissue Mobilization were applied to the: neck, right shoulder for 15 minutes, including:  R GH joint passive mobilization  STM to right upper trap, levator scapulae  Soft tissue mobilization to the R pectoral, latissimus and subscapularis muscles  Soft tissue mobilization to the B thoracic paraspinals   Sita passive mobilization to the thoracic spine and rib cage  PROM right shoulder  Manual Cx spine traction      neuromuscular re-education activities to improve: Coordination, Kinesthetic, and Posture for 08 minutes. The following activities were included:  Chin retractions x20  Scapular retractions x20  Shoulder shrugs x 20  Shoulder ER and IR isometrics at wall 10x5"    therapeutic activities to improve functional performance for 00  minutes, including:        Patient Education and Home Exercises       Education provided:   - HEP verbalized     Home Exercises Provided: Patient instructed to cont prior HEP. Exercises were reviewed and Brett Wayne was able to demonstrate them prior to the end of the session.  Brett Wayne demonstrated good  understanding of the education provided. See EMR under Patient Instructions for exercises provided during therapy sessions    Assessment     Brett Wayne presents to treatment reporting " No change" in her R shoulder Sx.  She was able to complete all therapeutic activities with no change in her R shoulder pain.  Continue to progress R shoulder AROM, strength and function.     The patient's current job specific task deficits include the following: prolonged sitting, driving, walking, reaching and lifting.    Brett Wayne Is is making Good progress towards meeting her goals.     Patient " prognosis is: Good.   Rehab potential is:  Good    Skilled Physical Therapy intervention is required at this time for the injured worker to address the musculoskeletal limitations and work-related functional deficits for their job as a .     Pt's spiritual, cultural and educational needs considered and pt agreeable to plan of care and goals.     Anticipated Barriers for therapy: self-limiting behaviors due to pain    Goals:      Short Term Goals: 3 weeks  ONGOING   Pt will be instructed in an exercise program to address functional deficits related to her neck, upper back and R shoulder Sx.  Decreased pain with ADL's to </= 6/10 at worst  Improve R shoulder ROM to >/= 130 degrees of flexion, 110 degrees of abduction and 30 degrees of internal and external rotation at 90 degrees of abduction.     Long Term Goals: 6 weeks  ONGOING   Pt will return to work full duty, full time.  Pt will be independent in a HEP to assist in managing their R upper quarter Sx.   Decreased pain with ADL's to </= 3/10 at worst  Improve R shoulder ROM to >/= 150 degrees of flexion, 130 degrees of abduction and 45 degrees of internal and external rotation at 90 degrees of abduction.  Pt will report improved functional ability through an improved score on the FOTO Survey.    Plan     Plan of care Certification: 8/23/2023 to 10/23/2023.     Outpatient Physical Therapy 2 times weekly for 6 weeks to include the following interventions: Cervical/Lumbar Traction, Electrical Stimulation as indicated, Manual Therapy, Moist Heat/ Ice, Neuromuscular Re-ed, Patient Education, Self Care, Therapeutic Activities, Therapeutic Exercise, and Dry needling.      Upon discharge from further skilled PT intervention it will determined if the need for a work conditioning program or Functional Capacity Evaluation is required to allow the injured worker to return to work with full potential achieved, continued improvement with body mechanics with advanced  functional activities, and further prevention of future work-related injuries.        Iftikhar Cesar, PT   10/12/2023

## 2023-10-18 ENCOUNTER — OFFICE VISIT (OUTPATIENT)
Dept: URGENT CARE | Facility: CLINIC | Age: 58
End: 2023-10-18
Payer: COMMERCIAL

## 2023-10-18 VITALS
BODY MASS INDEX: 24.83 KG/M2 | WEIGHT: 149 LBS | RESPIRATION RATE: 18 BRPM | TEMPERATURE: 98 F | OXYGEN SATURATION: 99 % | DIASTOLIC BLOOD PRESSURE: 78 MMHG | HEART RATE: 70 BPM | SYSTOLIC BLOOD PRESSURE: 129 MMHG | HEIGHT: 65 IN

## 2023-10-18 DIAGNOSIS — M75.101 TEAR OF RIGHT SUPRASPINATUS TENDON: Primary | ICD-10-CM

## 2023-10-18 DIAGNOSIS — S46.811D STRAIN OF RIGHT TRAPEZIUS MUSCLE, SUBSEQUENT ENCOUNTER: ICD-10-CM

## 2023-10-18 DIAGNOSIS — S29.011D MUSCLE STRAIN OF CHEST WALL, SUBSEQUENT ENCOUNTER: ICD-10-CM

## 2023-10-18 DIAGNOSIS — Z02.6 ENCOUNTER RELATED TO WORKER'S COMPENSATION CLAIM: ICD-10-CM

## 2023-10-18 PROCEDURE — 99213 OFFICE O/P EST LOW 20 MIN: CPT | Mod: S$GLB,,, | Performed by: NURSE PRACTITIONER

## 2023-10-18 PROCEDURE — 99213 PR OFFICE/OUTPT VISIT, EST, LEVL III, 20-29 MIN: ICD-10-PCS | Mod: S$GLB,,, | Performed by: NURSE PRACTITIONER

## 2023-10-18 RX ORDER — DUPILUMAB 300 MG/2ML
INJECTION, SOLUTION SUBCUTANEOUS
COMMUNITY
Start: 2023-10-13

## 2023-10-18 NOTE — PROGRESS NOTES
Subjective:      Patient ID: Brett Simmons is a 58 y.o. female.    Chief Complaint: Shoulder Injury (right) and Chest Pain    Patient's place of employment - [employer]  Patient's job title -   Date of Injury - 4/9/23  Body part injured - Shoulder/chest  Current work status per last visit - no activity  Improved, same, or worse - Same lil worse  Pain Scale right now (1-10) -  9    Ms. Simmons reports that her shoulder pain has become excruciating.  Says she is having sharp pains to the right side of her neck, right shoulder, across chest, to right axillary region and to right shoulder blade.  Has a sensation of something crawling up and down the right side of her neck and shoulder. Says she had numbness to R shoulder, scapula, arm to hand yesterday for the 1st time. No previous numbness or tingling. Says yesterday she was having trouble breathing and wheezing.  She has been following up with Cardiology regarding her cardiac and pulmonary issues.    She has her last physical therapy session tomorrow. MWT      Shoulder Injury   Associated symptoms include chest pain. Pertinent negatives include no numbness.   Chest Pain   Associated symptoms include back pain and shortness of breath. Pertinent negatives include no numbness.       Neck: Positive for neck pain. Negative for neck stiffness.   Cardiovascular:  Positive for chest pain.   Respiratory:  Positive for shortness of breath and wheezing.    Musculoskeletal:  Positive for pain, joint pain, abnormal ROM of joint, back pain, muscle cramps and muscle ache.   Skin:  Negative for erythema and bruising.   Neurological:  Negative for numbness and tingling.   Psychiatric/Behavioral:  Negative for sleep disturbance.      Objective:     Physical Exam  Constitutional:       General: She is not in acute distress.     Appearance: Normal appearance. She is normal weight.   HENT:      Right Ear: External ear normal.      Left Ear: External ear normal.   Eyes:       Conjunctiva/sclera: Conjunctivae normal.   Cardiovascular:      Pulses: Normal pulses.   Pulmonary:      Effort: Pulmonary effort is normal.   Abdominal:      General: Abdomen is flat.   Musculoskeletal:         General: Tenderness present. No swelling.      Right shoulder: Tenderness present. No swelling or deformity. Normal range of motion. Decreased strength. Normal pulse.      Comments: Diffuse pain to right shoulder and right scapula.  Pain with overhead reaching, abduction, adduction, internal and external rotation.  Seems most of her pain is with internal rotation.  Pain to right side of neck with bilateral rotation.  Pain with empty can test but able to resist.   Skin:     General: Skin is warm and dry.      Capillary Refill: Capillary refill takes less than 2 seconds.      Findings: No bruising or erythema.   Neurological:      General: No focal deficit present.      Mental Status: She is alert and oriented to person, place, and time.   Psychiatric:         Mood and Affect: Mood normal.         Behavior: Behavior normal.         Thought Content: Thought content normal.         Judgment: Judgment normal.        Assessment:      1. Tear of right supraspinatus tendon    2. Encounter related to worker's compensation claim    3. Strain of right trapezius muscle, subsequent encounter    4. Muscle strain of chest wall, subsequent encounter      Plan:   Ms. Simmons continues to have pain in the right shoulder.  She has 1 or 2 sessions remaining of physical therapy which she will complete.  She has Naprosyn at home to take as needed.  We will follow up in 2 weeks with Dr. Pedraza.       Patient Instructions: Attention not to aggravate affected area, Daily home exercises/warm soaks, Continue Physical Therapy (Complete PT sessions that are remaining.)   Restrictions: No lifting/pushing/pulling more than 10 lbs, No above the shoulder/overhead work, No driving company vehicles  Follow up in about 2 weeks (around  11/1/2023).    I spent a total of 30 minutes on the day of the visit.This includes face to face time and non-face to face time preparing to see the patient (eg, review of tests), obtaining and/or reviewing separately obtained history, documenting clinical information in the electronic or other health record, independently interpreting results and communicating results to the patient/family/caregiver, or care coordinator.

## 2023-10-18 NOTE — LETTER
[facility]  [address] [address]  Phone: [phone]  Fax: [phone]  [facility]: [id number]    Pt Name: Brett Simmons  Injury Date: 04/09/2023   Employee ID: [id number] Date of Treatment: 10/18/2023   Company: [employer]      Appointment Time: 03:30 PM Arrived: 3:36 PM   Provider: Lisa Vargas NP Time Out:4:55 PM      Office Treatment:   1. Tear of right supraspinatus tendon    2. Encounter related to worker's compensation claim    3. Strain of right trapezius muscle, subsequent encounter    4. Muscle strain of chest wall, subsequent encounter          Patient Instructions: Attention not to aggravate affected area, Daily home exercises/warm soaks, Continue Physical Therapy (Complete PT sessions that are remaining.)      Restrictions: No lifting/pushing/pulling more than 10 lbs, No above the shoulder/overhead work, No driving company vehicles     Return Appointment: 11/1/2023 at 3:30 PM ELIZABETH

## 2023-10-19 ENCOUNTER — CLINICAL SUPPORT (OUTPATIENT)
Dept: REHABILITATION | Facility: HOSPITAL | Age: 58
End: 2023-10-19
Payer: COMMERCIAL

## 2023-10-19 DIAGNOSIS — M25.511 CHRONIC RIGHT SHOULDER PAIN: Primary | ICD-10-CM

## 2023-10-19 DIAGNOSIS — G89.29 CHRONIC RIGHT SHOULDER PAIN: Primary | ICD-10-CM

## 2023-10-19 PROCEDURE — 97140 MANUAL THERAPY 1/> REGIONS: CPT | Mod: PO

## 2023-10-19 PROCEDURE — 97530 THERAPEUTIC ACTIVITIES: CPT | Mod: PO

## 2023-10-19 PROCEDURE — 97110 THERAPEUTIC EXERCISES: CPT | Mod: PO

## 2023-10-19 NOTE — PROGRESS NOTES
[facility]   Workers' Compensation Physical Therapy Treatment      Name: Brett Jernigan Simmons  Clinic Number: [id number]    Therapy Diagnosis:   Encounter Diagnosis   Name Primary?    Chronic right shoulder pain Yes             Physician: Lisa Vargas NP    Visit Date: 10/19/2023    Physician Orders: PT Eval and Treat   Medical Diagnosis from Referral:   M25.611 (ICD-10-CM) - Decreased range of motion of right shoulder   S46.001D (ICD-10-CM) - Rotator cuff injury, right, subsequent encounter   S46.811D (ICD-10-CM) - Strain of right trapezius muscle, subsequent encounter   S16.1XXA (ICD-10-CM) - Strain of cervical portion of left trapezius muscle   M54.6 (ICD-10-CM) - Acute bilateral thoracic back pain      Evaluation Date: 8/23/2023  Authorization Period Expiration: 12/31/2023  Plan of Care Expiration: 10/23/2023  Progress Note Due: 11/4/2023  Visit # / Visits authorized: 10/ 12  (# of No Show Appts 0/Number of Cancelled Appts 0)    FOTO: 1/ 3      Precautions: Standard     Time In: 1:56 pm  Time Out: 2:00 pm  Total Billable Time: 54 minutes    PTA: 1/5    Occupation (including job description if provided):   Job Demands: prolonged sitting and arm movements  Current Work Restrictions: out of work  Previous work status: Full time  Current work status: not working  Date last worked (if applicable): 4/09/2023    Subjective     Pt reports: that she began having R upper quarter pain from her scapulae up into her neck when she left the appointment she missed on Tuesday  She was compliant with home exercise program.  Response to previous treatment: soreness in shoulder  Function: difficulty with cooking, bathing using RUE    Pain: 8/10  Location: right shoulder    Objective      Objective Measures updated at progress report unless specified.      Observation: Pt was able to enter the clinic ambulating independently without an assistive device.      Posture: R side of pelvis  elevated     Cervical Range of Motion:     Degrees Pain   Flexion WFL no      Extension 75% Yes tight on the R      Right Rotation 75% Yes tight on the R      Left Rotation 75% no      Right Side Bending 75% no   Left Side Bending 30% Yes tight on the R      Shoulder Range of Motion:   Shoulder Left Right   Flexion 145 120   Abduction 110 100   ER 80 55   IR 45 35      Strength:  Cervical MMT   Flexion nt   Extension nt   Right Side Bend nt   Left Side Bend nt      Upper Extremity Strength  (R) UE   (L) UE     Shoulder flexion: Nt* Shoulder flexion: 5/5   Shoulder Abduction: Nt* Shoulder abduction: 5/5   Shoulder ER 5/5 Shoulder ER 5/5   Shoulder IR 5/5 Shoulder IR 5/5   Elbow flexion: 5/5 Elbow flexion: 5/5   Elbow extension: 5/5 Elbow extension: 5/5   Wrist flexion: 5/5 Wrist flexion: 5/5   Wrist extension: 5/5 Wrist extension: 5/5    5/5 : 5/5   Lower Trap nt Lower Trap nt   Middle Trap nt Middle Trap nt   Rhomboids nt Rhomboids nt            Special Tests:  Distraction (+)   Compression nt   Spurlings nt   Sharp-Darinel (-)   VA test nt   Lateral Flexion Alar Ligament (-)   DNF test nt         Joint Mobility: Hypomobile R GH joint,    PA's nt,    Transverse glides hypomobile,       Thoracic mobility: hypomobile      Palpation: R upper trap, scalenes, SCM       Sensation: intact     Flexibility: limited in Cx spine musculature   Functional Job Specific Testing:      Job Specific Task Job Demands Current Ability Deficit? (Yes or No)   1. siting Prolong sitting 4-5 hours 3-4 hours yes   2. reaching   painful deficit   3. lifting   painful geoffrey   (Testing should not be performed beyond that of the patient's job demands.)     Limitation/Restriction for FOTO  Survey     Therapist reviewed FOTO scores for Brett Wayne [facility] on 8/23/2023.   FOTO documents entered into [employer] - see Media section.     Limitation Score: %        Treatment     Brett Wayne received the treatments listed below:      Bolded activities  "performed today:    therapeutic exercises to develop strength, endurance, and ROM for 20 minutes including:  AAROM shoulder flexion with dowel 10x5"  B shoulder pro/retraction 20 x 2 with ball on the plinth  B shoulder IR/ER 20 x 2 with ball on the plinth (side to side)  AAROM shoulder ER with dowel 10x5"  Hook lying lower trunk rotation 10 x 3  Seated trunk rotation 10 x 3  Rows with green band x20   B shoulder extension with green band x20  +objective testing    manual therapy techniques: Joint mobilizations and Soft tissue Mobilization were applied to the: neck, right shoulder for 15 minutes, including:  R GH joint passive mobilization  STM to right upper trap, levator scapulae  Soft tissue mobilization to the R pectoral, latissimus and subscapularis muscles  Soft tissue mobilization to the B thoracic paraspinals   Ista passive mobilization to the thoracic spine and rib cage  PROM right shoulder  Manual Cx spine traction      neuromuscular re-education activities to improve: Coordination, Kinesthetic, and Posture for 08 minutes. The following activities were included:  Chin retractions x20  Scapular retractions x20  Shoulder shrugs x 20  Shoulder ER and IR isometrics at wall 10x5"    therapeutic activities to improve functional performance for 00  minutes, including:        Patient Education and Home Exercises       Education provided:   - HEP verbalized     Home Exercises Provided: Patient instructed to cont prior HEP. Exercises were reviewed and Brett Wayne was able to demonstrate them prior to the end of the session.  Brett Wayne demonstrated good  understanding of the education provided. See EMR under Patient Instructions for exercises provided during therapy sessions    Assessment     Brett Wayne presents to treatment reporting " No change" in her R shoulder Sx.  She was able to complete all therapeutic activities with no change in her R shoulder pain.  Continue to progress R shoulder AROM, strength and function. "     The patient's current job specific task deficits include the following: prolonged sitting, driving, walking, reaching and lifting.    Brett Jiménez is making Good progress towards meeting her goals.     Patient prognosis is: Good.   Rehab potential is:  Good    Skilled Physical Therapy intervention is required at this time for the injured worker to address the musculoskeletal limitations and work-related functional deficits for their job as a .     Pt's spiritual, cultural and educational needs considered and pt agreeable to plan of care and goals.     Anticipated Barriers for therapy: self-limiting behaviors due to pain    Goals:      Short Term Goals: 3 weeks  ONGOING   Pt will be instructed in an exercise program to address functional deficits related to her neck, upper back and R shoulder Sx.  Decreased pain with ADL's to </= 6/10 at worst  Improve R shoulder ROM to >/= 130 degrees of flexion, 110 degrees of abduction and 30 degrees of internal and external rotation at 90 degrees of abduction.     Long Term Goals: 6 weeks  ONGOING   Pt will return to work full duty, full time.  Pt will be independent in a HEP to assist in managing their R upper quarter Sx.   Decreased pain with ADL's to </= 3/10 at worst  Improve R shoulder ROM to >/= 150 degrees of flexion, 130 degrees of abduction and 45 degrees of internal and external rotation at 90 degrees of abduction.  Pt will report improved functional ability through an improved score on the FOTO Survey.    Plan     Plan of care Certification: 8/23/2023 to 10/23/2023.     Outpatient Physical Therapy 2 times weekly for 6 weeks to include the following interventions: Cervical/Lumbar Traction, Electrical Stimulation as indicated, Manual Therapy, Moist Heat/ Ice, Neuromuscular Re-ed, Patient Education, Self Care, Therapeutic Activities, Therapeutic Exercise, and Dry needling.      Upon discharge from further skilled PT intervention it will determined if the need  for a work conditioning program or Functional Capacity Evaluation is required to allow the injured worker to return to work with full potential achieved, continued improvement with body mechanics with advanced functional activities, and further prevention of future work-related injuries.        Iftikhar Cesar, PT   10/19/2023

## 2023-11-01 ENCOUNTER — OFFICE VISIT (OUTPATIENT)
Dept: URGENT CARE | Facility: CLINIC | Age: 58
End: 2023-11-01
Payer: COMMERCIAL

## 2023-11-01 VITALS
OXYGEN SATURATION: 95 % | SYSTOLIC BLOOD PRESSURE: 158 MMHG | HEART RATE: 95 BPM | BODY MASS INDEX: 29.25 KG/M2 | WEIGHT: 149 LBS | DIASTOLIC BLOOD PRESSURE: 84 MMHG | TEMPERATURE: 98 F | RESPIRATION RATE: 18 BRPM | HEIGHT: 60 IN

## 2023-11-01 DIAGNOSIS — S46.811D STRAIN OF RIGHT TRAPEZIUS MUSCLE, SUBSEQUENT ENCOUNTER: ICD-10-CM

## 2023-11-01 DIAGNOSIS — Z02.6 ENCOUNTER RELATED TO WORKER'S COMPENSATION CLAIM: ICD-10-CM

## 2023-11-01 DIAGNOSIS — S29.011D MUSCLE STRAIN OF CHEST WALL, SUBSEQUENT ENCOUNTER: ICD-10-CM

## 2023-11-01 DIAGNOSIS — M75.101 TEAR OF RIGHT SUPRASPINATUS TENDON: Primary | ICD-10-CM

## 2023-11-01 PROCEDURE — 99214 PR OFFICE/OUTPT VISIT, EST, LEVL IV, 30-39 MIN: ICD-10-PCS | Mod: S$GLB,,, | Performed by: STUDENT IN AN ORGANIZED HEALTH CARE EDUCATION/TRAINING PROGRAM

## 2023-11-01 PROCEDURE — 99214 OFFICE O/P EST MOD 30 MIN: CPT | Mod: S$GLB,,, | Performed by: STUDENT IN AN ORGANIZED HEALTH CARE EDUCATION/TRAINING PROGRAM

## 2023-11-01 NOTE — LETTER
[facility]  [address] [address]  Phone: [phone]  Fax: [phone]  [facility]: [email or web address]    Pt Name: Brett Simmons  Injury Date: 04/09/2023   Employee ID: [id number] Date of Treatment: 11/01/2023   Company: [employer]      Appointment Time: 03:15 PM Arrived: 4:14   Provider: Milagros Louis MD Time Out:5:08     Office Treatment:   1. Tear of right supraspinatus tendon    2. Encounter related to worker's compensation claim    3. Muscle strain of chest wall, subsequent encounter    4. Strain of right trapezius muscle, subsequent encounter          Patient Instructions: Attention not to aggravate affected area, Daily home exercises/warm soaks    Restrictions: No driving company vehicles, No above the shoulder/overhead work, No lifting/pushing/pulling more than 10 lbs     Return Appointment: 11/14/2023 at 3:30pm

## 2023-11-01 NOTE — PROGRESS NOTES
Subjective:      Patient ID: Brett Simmons is a 58 y.o. female.    Chief Complaint: No chief complaint on file.    Patient's place of employment - [employer]  Patient's job title -   Date of Injury - 4/9/23  Body part injured - Shoulder/Chest  Current work status per last visit - No activity  Improved, same, or worse - Same  Pain Scale right now (1-10) - 8/10    See MA note above. Begin MD note:  Patient has completed PT since her last visit and denies any improvement. She continues to report severe pain to the right shoulder and neck region as well as pain in the lower back. Uses muscle relaxer at bedtime only. She is no longer taking any anti-inflammatories for pain. She was fired from her job on 10/5 and therefore unemployed. No new complaints or concerns at this time.         Constitution: Positive for activity change and generalized weakness.   Neck: Positive for neck pain. Negative for neck stiffness.   Cardiovascular:  Positive for chest pain and sob on exertion.   Musculoskeletal:  Positive for pain, joint pain and abnormal ROM of joint.   Neurological:  Positive for numbness and tingling.   Psychiatric/Behavioral:  Negative for sleep disturbance.      Objective:     Physical Exam  Vitals and nursing note reviewed.   Constitutional:       General: She is not in acute distress.     Appearance: She is not ill-appearing.   HENT:      Head: Normocephalic.   Eyes:      Conjunctiva/sclera: Conjunctivae normal.   Pulmonary:      Effort: No respiratory distress.   Musculoskeletal:        Arms:       Comments: Abduction and flexion to approx 120 degrees. Able to internally rotate right shoulder to place hand on upper back at left of approx C7-T1. Internal rotation to place hand on low back to upper lumbar region. TTP diffusely over anterior and posterior right shoulder extending up right trapezius muscle. +empty can testing.    Skin:     General: Skin is warm and dry.   Neurological:      Mental  Status: She is alert and oriented to person, place, and time.   Psychiatric:         Attention and Perception: Attention normal.         Mood and Affect: Mood normal.         Behavior: Behavior normal.        Assessment:      1. Tear of right supraspinatus tendon    2. Encounter related to worker's compensation claim    3. Muscle strain of chest wall, subsequent encounter    4. Strain of right trapezius muscle, subsequent encounter      EXAMINATION:  MRI SHOULDER WITHOUT CONTRAST RIGHT     CLINICAL HISTORY:  Shoulder trauma, rotator cuff tear suspected, xray done;  Encounter for examination for insurance purposes     TECHNIQUE:  Multiplanar multislice images are were performed through the right shoulder.     Impression:     Small full-thickness rotator cuff tear of the distal supraspinatus tendon.  See above comments.  Recommend follow-up.        Electronically signed by: Jono Henry  Date:                                            08/26/2023  Time:                                           18:04    Plan:     Ms. Simmons now has chronic pain of her right shoulder s/p injury almost 7 months ago with small rotator cuff tear noted on MRI. Exam mostly unchanged from my previous exam. She has completed 2 rounds of PT with no relief. Referred to ortho for consultation and evaluation of supraspinatus tendon tear. She was scheduled to see Dr. Pedraza today but missed her scheduled appointment and he was unavailable once she arrived. She has been scheduled in 2 weeks on a day that he is in clinic and will be contacted to reschedule if needed if he's out.  Ok to f/u sooner if needed for acute change.       Patient Instructions: Attention not to aggravate affected area, Daily home exercises/warm soaks   Restrictions: No driving company vehicles, No above the shoulder/overhead work, No lifting/pushing/pulling more than 10 lbs  Follow up in about 2 weeks (around 11/15/2023).

## 2023-11-17 ENCOUNTER — OFFICE VISIT (OUTPATIENT)
Dept: URGENT CARE | Facility: CLINIC | Age: 58
End: 2023-11-17
Payer: COMMERCIAL

## 2023-11-17 VITALS
RESPIRATION RATE: 18 BRPM | TEMPERATURE: 98 F | OXYGEN SATURATION: 95 % | HEART RATE: 74 BPM | BODY MASS INDEX: 29.25 KG/M2 | WEIGHT: 149 LBS | SYSTOLIC BLOOD PRESSURE: 141 MMHG | HEIGHT: 60 IN | DIASTOLIC BLOOD PRESSURE: 84 MMHG

## 2023-11-17 DIAGNOSIS — S39.012D LUMBAR STRAIN, SUBSEQUENT ENCOUNTER: ICD-10-CM
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
## 2023-11-17 DIAGNOSIS — S16.1XXD CERVICAL STRAIN, SUBSEQUENT ENCOUNTER: ICD-10-CM

## 2023-11-17 DIAGNOSIS — S29.019D THORACIC MYOFASCIAL STRAIN, SUBSEQUENT ENCOUNTER: ICD-10-CM

## 2023-11-17 PROCEDURE — 99214 OFFICE O/P EST MOD 30 MIN: CPT | Mod: S$GLB,,,

## 2023-11-17 PROCEDURE — 99214 PR OFFICE/OUTPT VISIT, EST, LEVL IV, 30-39 MIN: ICD-10-PCS | Mod: S$GLB,,,
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
